# Patient Record
Sex: FEMALE | Race: OTHER | Employment: OTHER | URBAN - METROPOLITAN AREA
[De-identification: names, ages, dates, MRNs, and addresses within clinical notes are randomized per-mention and may not be internally consistent; named-entity substitution may affect disease eponyms.]

---

## 2017-11-29 ENCOUNTER — OP HISTORICAL/CONVERTED ENCOUNTER (OUTPATIENT)
Dept: OTHER | Age: 67
End: 2017-11-29

## 2019-04-09 ENCOUNTER — OP HISTORICAL/CONVERTED ENCOUNTER (OUTPATIENT)
Dept: OTHER | Age: 69
End: 2019-04-09

## 2019-05-31 ENCOUNTER — HOSPITAL ENCOUNTER (OUTPATIENT)
Dept: PREADMISSION TESTING | Age: 69
Discharge: HOME | End: 2019-05-31
Payer: MEDICARE

## 2019-05-31 VITALS
DIASTOLIC BLOOD PRESSURE: 84 MMHG | BODY MASS INDEX: 24.51 KG/M2 | SYSTOLIC BLOOD PRESSURE: 135 MMHG | RESPIRATION RATE: 16 BRPM | HEART RATE: 66 BPM | HEIGHT: 62 IN | TEMPERATURE: 97.4 F | WEIGHT: 133.2 LBS

## 2019-05-31 LAB
ANION GAP SERPL CALC-SCNC: 5 MMOL/L (ref 5–15)
ATRIAL RATE: 64 BPM
BASOPHILS # BLD: 0.1 K/UL (ref 0–0.1)
BASOPHILS NFR BLD: 1 % (ref 0–1)
BUN SERPL-MCNC: 11 MG/DL (ref 6–20)
BUN/CREAT SERPL: 15 (ref 12–20)
CALCIUM SERPL-MCNC: 9.4 MG/DL (ref 8.5–10.1)
CALCULATED P AXIS, ECG09: 46 DEGREES
CALCULATED R AXIS, ECG10: 16 DEGREES
CALCULATED T AXIS, ECG11: 30 DEGREES
CHLORIDE SERPL-SCNC: 107 MMOL/L (ref 97–108)
CO2 SERPL-SCNC: 27 MMOL/L (ref 21–32)
CREAT SERPL-MCNC: 0.75 MG/DL (ref 0.55–1.02)
DIAGNOSIS, 93000: NORMAL
DIFFERENTIAL METHOD BLD: NORMAL
EOSINOPHIL # BLD: 0.1 K/UL (ref 0–0.4)
EOSINOPHIL NFR BLD: 2 % (ref 0–7)
ERYTHROCYTE [DISTWIDTH] IN BLOOD BY AUTOMATED COUNT: 13.5 % (ref 11.5–14.5)
GLUCOSE SERPL-MCNC: 89 MG/DL (ref 65–100)
HCT VFR BLD AUTO: 40.5 % (ref 35–47)
HGB BLD-MCNC: 13.1 G/DL (ref 11.5–16)
IMM GRANULOCYTES # BLD AUTO: 0 K/UL (ref 0–0.04)
IMM GRANULOCYTES NFR BLD AUTO: 0 % (ref 0–0.5)
LYMPHOCYTES # BLD: 2.2 K/UL (ref 0.8–3.5)
LYMPHOCYTES NFR BLD: 37 % (ref 12–49)
MCH RBC QN AUTO: 27.8 PG (ref 26–34)
MCHC RBC AUTO-ENTMCNC: 32.3 G/DL (ref 30–36.5)
MCV RBC AUTO: 85.8 FL (ref 80–99)
MONOCYTES # BLD: 0.6 K/UL (ref 0–1)
MONOCYTES NFR BLD: 10 % (ref 5–13)
NEUTS SEG # BLD: 3 K/UL (ref 1.8–8)
NEUTS SEG NFR BLD: 50 % (ref 32–75)
NRBC # BLD: 0 K/UL (ref 0–0.01)
NRBC BLD-RTO: 0 PER 100 WBC
P-R INTERVAL, ECG05: 154 MS
PLATELET # BLD AUTO: 305 K/UL (ref 150–400)
PMV BLD AUTO: 11.2 FL (ref 8.9–12.9)
POTASSIUM SERPL-SCNC: 4.3 MMOL/L (ref 3.5–5.1)
Q-T INTERVAL, ECG07: 432 MS
QRS DURATION, ECG06: 70 MS
QTC CALCULATION (BEZET), ECG08: 445 MS
RBC # BLD AUTO: 4.72 M/UL (ref 3.8–5.2)
SODIUM SERPL-SCNC: 139 MMOL/L (ref 136–145)
VENTRICULAR RATE, ECG03: 64 BPM
WBC # BLD AUTO: 6.1 K/UL (ref 3.6–11)

## 2019-05-31 PROCEDURE — 93005 ELECTROCARDIOGRAM TRACING: CPT

## 2019-05-31 PROCEDURE — 80048 BASIC METABOLIC PNL TOTAL CA: CPT

## 2019-05-31 PROCEDURE — 36415 COLL VENOUS BLD VENIPUNCTURE: CPT

## 2019-05-31 PROCEDURE — 85025 COMPLETE CBC W/AUTO DIFF WBC: CPT

## 2019-05-31 RX ORDER — LANOLIN ALCOHOL/MO/W.PET/CERES
325 CREAM (GRAM) TOPICAL
COMMUNITY

## 2019-05-31 RX ORDER — ATORVASTATIN CALCIUM 20 MG/1
20 TABLET, FILM COATED ORAL
COMMUNITY

## 2019-05-31 RX ORDER — TRIAMTERENE/HYDROCHLOROTHIAZID 37.5-25 MG
1 TABLET ORAL DAILY
COMMUNITY

## 2019-05-31 RX ORDER — ASPIRIN 81 MG/1
81 TABLET ORAL DAILY
COMMUNITY

## 2019-05-31 RX ORDER — LEVOTHYROXINE SODIUM 50 UG/1
50 TABLET ORAL
COMMUNITY

## 2019-05-31 NOTE — PERIOP NOTES
DO NOT EAT OR DRINK ANYTHING AFTER MIDNIGHT, except as instructed THE NIGHT BEFORE SURGERY. PT GIVEN OPPORTUNITY TO ASK ADDITIONAL QUESTIONS.

## 2019-06-12 ENCOUNTER — ANESTHESIA EVENT (OUTPATIENT)
Dept: SURGERY | Age: 69
DRG: 027 | End: 2019-06-12
Payer: MEDICARE

## 2019-06-13 ENCOUNTER — ANESTHESIA (OUTPATIENT)
Dept: SURGERY | Age: 69
DRG: 027 | End: 2019-06-13
Payer: MEDICARE

## 2019-06-13 ENCOUNTER — HOSPITAL ENCOUNTER (INPATIENT)
Age: 69
LOS: 3 days | Discharge: HOME | DRG: 027 | End: 2019-06-16
Attending: NEUROLOGICAL SURGERY | Admitting: NEUROLOGICAL SURGERY
Payer: MEDICARE

## 2019-06-13 ENCOUNTER — APPOINTMENT (OUTPATIENT)
Dept: GENERAL RADIOLOGY | Age: 69
DRG: 027 | End: 2019-06-13
Attending: ANESTHESIOLOGY
Payer: MEDICARE

## 2019-06-13 PROBLEM — G51.39 HEMIFACIAL SPASM: Status: ACTIVE | Noted: 2019-06-13

## 2019-06-13 LAB
ARTERIAL PATENCY WRIST A: YES
BASE DEFICIT BLD-SCNC: 5 MMOL/L
BDY SITE: ABNORMAL
GAS FLOW.O2 O2 DELIVERY SYS: ABNORMAL L/MIN
GAS FLOW.O2 SETTING OXYMISER: 23 BPM
HCO3 BLD-SCNC: 20.1 MMOL/L (ref 22–26)
O2/TOTAL GAS SETTING VFR VENT: 0.33 %
PCO2 BLD: 34.5 MMHG (ref 35–45)
PEEP RESPIRATORY: 5 CMH2O
PH BLD: 7.37 [PH] (ref 7.35–7.45)
PO2 BLD: 135 MMHG (ref 80–100)
SAO2 % BLD: 99 % (ref 92–97)
SPECIMEN TYPE: ABNORMAL
VENTILATION MODE VENT: ABNORMAL
VOLUME CONTROL IVLC: YES
VT SETTING VENT: 340 ML

## 2019-06-13 PROCEDURE — 77030003892 HC BIT DRL TWST MEDT -B: Performed by: NEUROLOGICAL SURGERY

## 2019-06-13 PROCEDURE — 77030013797 HC KT TRNSDUC PRSSR EDWD -A

## 2019-06-13 PROCEDURE — 77030020782 HC GWN BAIR PAWS FLX 3M -B

## 2019-06-13 PROCEDURE — 0NB70ZZ EXCISION OF OCCIPITAL BONE, OPEN APPROACH: ICD-10-PCS | Performed by: NEUROLOGICAL SURGERY

## 2019-06-13 PROCEDURE — 74011250636 HC RX REV CODE- 250/636: Performed by: NEUROLOGICAL SURGERY

## 2019-06-13 PROCEDURE — 77030019655 HC PRB STIM CRAN MEDT -B: Performed by: NEUROLOGICAL SURGERY

## 2019-06-13 PROCEDURE — 77030008684 HC TU ET CUF COVD -B: Performed by: ANESTHESIOLOGY

## 2019-06-13 PROCEDURE — 05HM33Z INSERTION OF INFUSION DEVICE INTO RIGHT INTERNAL JUGULAR VEIN, PERCUTANEOUS APPROACH: ICD-10-PCS | Performed by: ANESTHESIOLOGY

## 2019-06-13 PROCEDURE — 82803 BLOOD GASES ANY COMBINATION: CPT

## 2019-06-13 PROCEDURE — 77030003029 HC SUT VCRL J&J -B: Performed by: NEUROLOGICAL SURGERY

## 2019-06-13 PROCEDURE — 74011000250 HC RX REV CODE- 250: Performed by: NEUROLOGICAL SURGERY

## 2019-06-13 PROCEDURE — 76210000016 HC OR PH I REC 1 TO 1.5 HR: Performed by: NEUROLOGICAL SURGERY

## 2019-06-13 PROCEDURE — 77030002946 HC SUT NRLN J&J -B: Performed by: NEUROLOGICAL SURGERY

## 2019-06-13 PROCEDURE — 74011000272 HC RX REV CODE- 272: Performed by: NEUROLOGICAL SURGERY

## 2019-06-13 PROCEDURE — B543ZZA ULTRASONOGRAPHY OF RIGHT JUGULAR VEINS, GUIDANCE: ICD-10-PCS | Performed by: ANESTHESIOLOGY

## 2019-06-13 PROCEDURE — C1751 CATH, INF, PER/CENT/MIDLINE: HCPCS

## 2019-06-13 PROCEDURE — 36415 COLL VENOUS BLD VENIPUNCTURE: CPT

## 2019-06-13 PROCEDURE — 74011250636 HC RX REV CODE- 250/636

## 2019-06-13 PROCEDURE — 76060000038 HC ANESTHESIA 3.5 TO 4 HR: Performed by: NEUROLOGICAL SURGERY

## 2019-06-13 PROCEDURE — 77030013137 HC MRK XR CRAN BUSA -A: Performed by: NEUROLOGICAL SURGERY

## 2019-06-13 PROCEDURE — 86900 BLOOD TYPING SEROLOGIC ABO: CPT

## 2019-06-13 PROCEDURE — 4A133B1 MONITORING OF ARTERIAL PRESSURE, PERIPHERAL, PERCUTANEOUS APPROACH: ICD-10-PCS | Performed by: ANESTHESIOLOGY

## 2019-06-13 PROCEDURE — 77030031139 HC SUT VCRL2 J&J -A: Performed by: NEUROLOGICAL SURGERY

## 2019-06-13 PROCEDURE — 74011250637 HC RX REV CODE- 250/637: Performed by: NEUROLOGICAL SURGERY

## 2019-06-13 PROCEDURE — 77030032490 HC SLV COMPR SCD KNE COVD -B: Performed by: NEUROLOGICAL SURGERY

## 2019-06-13 PROCEDURE — 71045 X-RAY EXAM CHEST 1 VIEW: CPT

## 2019-06-13 PROCEDURE — 4A133J1 MONITORING OF ARTERIAL PULSE, PERIPHERAL, PERCUTANEOUS APPROACH: ICD-10-PCS | Performed by: ANESTHESIOLOGY

## 2019-06-13 PROCEDURE — 86923 COMPATIBILITY TEST ELECTRIC: CPT

## 2019-06-13 PROCEDURE — P9045 ALBUMIN (HUMAN), 5%, 250 ML: HCPCS

## 2019-06-13 PROCEDURE — 77030011640 HC PAD GRND REM COVD -A: Performed by: NEUROLOGICAL SURGERY

## 2019-06-13 PROCEDURE — 36600 WITHDRAWAL OF ARTERIAL BLOOD: CPT

## 2019-06-13 PROCEDURE — 77030004391 HC BUR FLUT MEDT -C: Performed by: NEUROLOGICAL SURGERY

## 2019-06-13 PROCEDURE — 03HY32Z INSERTION OF MONITORING DEVICE INTO UPPER ARTERY, PERCUTANEOUS APPROACH: ICD-10-PCS | Performed by: ANESTHESIOLOGY

## 2019-06-13 PROCEDURE — 77030004472 HC BUR TAPR MEDT -B: Performed by: NEUROLOGICAL SURGERY

## 2019-06-13 PROCEDURE — 74011000250 HC RX REV CODE- 250

## 2019-06-13 PROCEDURE — 77030014491 HC PLEDG PTFE BARD -A: Performed by: NEUROLOGICAL SURGERY

## 2019-06-13 PROCEDURE — C1713 ANCHOR/SCREW BN/BN,TIS/BN: HCPCS | Performed by: NEUROLOGICAL SURGERY

## 2019-06-13 PROCEDURE — 77030002973 HC SUT PLEDG CV SFT OVL TELE -B: Performed by: NEUROLOGICAL SURGERY

## 2019-06-13 PROCEDURE — 65610000006 HC RM INTENSIVE CARE

## 2019-06-13 PROCEDURE — 77030013079 HC BLNKT BAIR HGGR 3M -A: Performed by: ANESTHESIOLOGY

## 2019-06-13 PROCEDURE — 76010000174 HC OR TIME 3.5 TO 4 HR INTENSV-TIER 1: Performed by: NEUROLOGICAL SURGERY

## 2019-06-13 PROCEDURE — 77030014647 HC SEAL FBRN TISSL BAXT -D: Performed by: NEUROLOGICAL SURGERY

## 2019-06-13 PROCEDURE — 00NM0ZZ RELEASE FACIAL NERVE, OPEN APPROACH: ICD-10-PCS | Performed by: NEUROLOGICAL SURGERY

## 2019-06-13 PROCEDURE — 77030018836 HC SOL IRR NACL ICUM -A: Performed by: NEUROLOGICAL SURGERY

## 2019-06-13 PROCEDURE — 77030019908 HC STETH ESOPH SIMS -A: Performed by: ANESTHESIOLOGY

## 2019-06-13 PROCEDURE — 74011250636 HC RX REV CODE- 250/636: Performed by: ANESTHESIOLOGY

## 2019-06-13 DEVICE — 5CC HYDROSET INJECTABLE CEMENT
Type: IMPLANTABLE DEVICE | Site: BRAIN | Status: FUNCTIONAL
Brand: HYDROSET

## 2019-06-13 RX ORDER — ONDANSETRON 2 MG/ML
INJECTION INTRAMUSCULAR; INTRAVENOUS AS NEEDED
Status: DISCONTINUED | OUTPATIENT
Start: 2019-06-13 | End: 2019-06-13 | Stop reason: HOSPADM

## 2019-06-13 RX ORDER — SODIUM CHLORIDE 0.9 % (FLUSH) 0.9 %
5-40 SYRINGE (ML) INJECTION EVERY 8 HOURS
Status: DISCONTINUED | OUTPATIENT
Start: 2019-06-13 | End: 2019-06-13 | Stop reason: HOSPADM

## 2019-06-13 RX ORDER — OXYCODONE HYDROCHLORIDE 5 MG/1
5 TABLET ORAL AS NEEDED
Status: DISCONTINUED | OUTPATIENT
Start: 2019-06-13 | End: 2019-06-13 | Stop reason: HOSPADM

## 2019-06-13 RX ORDER — ACETAMINOPHEN 325 MG/1
650 TABLET ORAL ONCE
Status: DISCONTINUED | OUTPATIENT
Start: 2019-06-13 | End: 2019-06-13 | Stop reason: HOSPADM

## 2019-06-13 RX ORDER — MORPHINE SULFATE 10 MG/ML
2 INJECTION, SOLUTION INTRAMUSCULAR; INTRAVENOUS
Status: DISCONTINUED | OUTPATIENT
Start: 2019-06-13 | End: 2019-06-13 | Stop reason: HOSPADM

## 2019-06-13 RX ORDER — CEFAZOLIN SODIUM/WATER 2 G/20 ML
2 SYRINGE (ML) INTRAVENOUS ONCE
Status: COMPLETED | OUTPATIENT
Start: 2019-06-13 | End: 2019-06-13

## 2019-06-13 RX ORDER — MANNITOL 20 G/100ML
INJECTION, SOLUTION INTRAVENOUS
Status: DISCONTINUED | OUTPATIENT
Start: 2019-06-13 | End: 2019-06-13 | Stop reason: HOSPADM

## 2019-06-13 RX ORDER — HYDROMORPHONE HYDROCHLORIDE 2 MG/ML
INJECTION, SOLUTION INTRAMUSCULAR; INTRAVENOUS; SUBCUTANEOUS AS NEEDED
Status: DISCONTINUED | OUTPATIENT
Start: 2019-06-13 | End: 2019-06-13 | Stop reason: HOSPADM

## 2019-06-13 RX ORDER — DEXAMETHASONE SODIUM PHOSPHATE 4 MG/ML
INJECTION, SOLUTION INTRA-ARTICULAR; INTRALESIONAL; INTRAMUSCULAR; INTRAVENOUS; SOFT TISSUE AS NEEDED
Status: DISCONTINUED | OUTPATIENT
Start: 2019-06-13 | End: 2019-06-13 | Stop reason: HOSPADM

## 2019-06-13 RX ORDER — DEXAMETHASONE SODIUM PHOSPHATE 4 MG/ML
4 INJECTION, SOLUTION INTRA-ARTICULAR; INTRALESIONAL; INTRAMUSCULAR; INTRAVENOUS; SOFT TISSUE EVERY 6 HOURS
Status: DISCONTINUED | OUTPATIENT
Start: 2019-06-13 | End: 2019-06-14

## 2019-06-13 RX ORDER — FENTANYL CITRATE 50 UG/ML
50 INJECTION, SOLUTION INTRAMUSCULAR; INTRAVENOUS AS NEEDED
Status: DISCONTINUED | OUTPATIENT
Start: 2019-06-13 | End: 2019-06-13 | Stop reason: HOSPADM

## 2019-06-13 RX ORDER — AMOXICILLIN 250 MG
1 CAPSULE ORAL DAILY
Status: DISCONTINUED | OUTPATIENT
Start: 2019-06-14 | End: 2019-06-16 | Stop reason: HOSPADM

## 2019-06-13 RX ORDER — SODIUM CHLORIDE 9 MG/ML
250 INJECTION, SOLUTION INTRAVENOUS AS NEEDED
Status: DISCONTINUED | OUTPATIENT
Start: 2019-06-13 | End: 2019-06-13

## 2019-06-13 RX ORDER — TRIAMTERENE/HYDROCHLOROTHIAZID 37.5-25 MG
1 TABLET ORAL DAILY
Status: DISCONTINUED | OUTPATIENT
Start: 2019-06-14 | End: 2019-06-16 | Stop reason: HOSPADM

## 2019-06-13 RX ORDER — PROPOFOL 10 MG/ML
INJECTION, EMULSION INTRAVENOUS AS NEEDED
Status: DISCONTINUED | OUTPATIENT
Start: 2019-06-13 | End: 2019-06-13 | Stop reason: HOSPADM

## 2019-06-13 RX ORDER — ROCURONIUM BROMIDE 10 MG/ML
INJECTION, SOLUTION INTRAVENOUS AS NEEDED
Status: DISCONTINUED | OUTPATIENT
Start: 2019-06-13 | End: 2019-06-13 | Stop reason: HOSPADM

## 2019-06-13 RX ORDER — LEVOTHYROXINE SODIUM 50 UG/1
50 TABLET ORAL
Status: DISCONTINUED | OUTPATIENT
Start: 2019-06-14 | End: 2019-06-16 | Stop reason: HOSPADM

## 2019-06-13 RX ORDER — FENTANYL CITRATE 50 UG/ML
25 INJECTION, SOLUTION INTRAMUSCULAR; INTRAVENOUS
Status: COMPLETED | OUTPATIENT
Start: 2019-06-13 | End: 2019-06-13

## 2019-06-13 RX ORDER — LIDOCAINE HYDROCHLORIDE 10 MG/ML
0.5 INJECTION, SOLUTION EPIDURAL; INFILTRATION; INTRACAUDAL; PERINEURAL AS NEEDED
Status: DISCONTINUED | OUTPATIENT
Start: 2019-06-13 | End: 2019-06-13 | Stop reason: HOSPADM

## 2019-06-13 RX ORDER — SODIUM CHLORIDE 0.9 % (FLUSH) 0.9 %
5-40 SYRINGE (ML) INJECTION EVERY 8 HOURS
Status: DISCONTINUED | OUTPATIENT
Start: 2019-06-13 | End: 2019-06-16 | Stop reason: HOSPADM

## 2019-06-13 RX ORDER — MIDAZOLAM HYDROCHLORIDE 1 MG/ML
1 INJECTION, SOLUTION INTRAMUSCULAR; INTRAVENOUS AS NEEDED
Status: DISCONTINUED | OUTPATIENT
Start: 2019-06-13 | End: 2019-06-13 | Stop reason: HOSPADM

## 2019-06-13 RX ORDER — CEFAZOLIN SODIUM/WATER 2 G/20 ML
2 SYRINGE (ML) INTRAVENOUS EVERY 8 HOURS
Status: COMPLETED | OUTPATIENT
Start: 2019-06-13 | End: 2019-06-14

## 2019-06-13 RX ORDER — PHENYLEPHRINE HCL IN 0.9% NACL 0.4MG/10ML
SYRINGE (ML) INTRAVENOUS AS NEEDED
Status: DISCONTINUED | OUTPATIENT
Start: 2019-06-13 | End: 2019-06-13 | Stop reason: HOSPADM

## 2019-06-13 RX ORDER — SODIUM CHLORIDE 0.9 % (FLUSH) 0.9 %
5-40 SYRINGE (ML) INJECTION AS NEEDED
Status: DISCONTINUED | OUTPATIENT
Start: 2019-06-13 | End: 2019-06-13 | Stop reason: HOSPADM

## 2019-06-13 RX ORDER — POTASSIUM CHLORIDE AND SODIUM CHLORIDE 450; 150 MG/100ML; MG/100ML
INJECTION, SOLUTION INTRAVENOUS CONTINUOUS
Status: DISCONTINUED | OUTPATIENT
Start: 2019-06-13 | End: 2019-06-16 | Stop reason: HOSPADM

## 2019-06-13 RX ORDER — ONDANSETRON 2 MG/ML
4 INJECTION INTRAMUSCULAR; INTRAVENOUS AS NEEDED
Status: DISCONTINUED | OUTPATIENT
Start: 2019-06-13 | End: 2019-06-13 | Stop reason: HOSPADM

## 2019-06-13 RX ORDER — SODIUM CHLORIDE 0.9 % (FLUSH) 0.9 %
5-40 SYRINGE (ML) INJECTION AS NEEDED
Status: DISCONTINUED | OUTPATIENT
Start: 2019-06-13 | End: 2019-06-16 | Stop reason: HOSPADM

## 2019-06-13 RX ORDER — ACETAMINOPHEN 325 MG/1
650 TABLET ORAL
Status: DISCONTINUED | OUTPATIENT
Start: 2019-06-13 | End: 2019-06-16 | Stop reason: HOSPADM

## 2019-06-13 RX ORDER — ATORVASTATIN CALCIUM 20 MG/1
20 TABLET, FILM COATED ORAL
Status: DISCONTINUED | OUTPATIENT
Start: 2019-06-13 | End: 2019-06-16 | Stop reason: HOSPADM

## 2019-06-13 RX ORDER — ALBUMIN HUMAN 50 G/1000ML
SOLUTION INTRAVENOUS AS NEEDED
Status: DISCONTINUED | OUTPATIENT
Start: 2019-06-13 | End: 2019-06-13 | Stop reason: HOSPADM

## 2019-06-13 RX ORDER — SUCCINYLCHOLINE CHLORIDE 20 MG/ML
INJECTION INTRAMUSCULAR; INTRAVENOUS AS NEEDED
Status: DISCONTINUED | OUTPATIENT
Start: 2019-06-13 | End: 2019-06-13 | Stop reason: HOSPADM

## 2019-06-13 RX ORDER — DEXTROSE, SODIUM CHLORIDE, SODIUM LACTATE, POTASSIUM CHLORIDE, AND CALCIUM CHLORIDE 5; .6; .31; .03; .02 G/100ML; G/100ML; G/100ML; G/100ML; G/100ML
125 INJECTION, SOLUTION INTRAVENOUS CONTINUOUS
Status: DISCONTINUED | OUTPATIENT
Start: 2019-06-13 | End: 2019-06-13 | Stop reason: HOSPADM

## 2019-06-13 RX ORDER — LABETALOL HYDROCHLORIDE 5 MG/ML
10 INJECTION, SOLUTION INTRAVENOUS
Status: DISCONTINUED | OUTPATIENT
Start: 2019-06-13 | End: 2019-06-14

## 2019-06-13 RX ORDER — LIDOCAINE HYDROCHLORIDE 20 MG/ML
INJECTION, SOLUTION EPIDURAL; INFILTRATION; INTRACAUDAL; PERINEURAL AS NEEDED
Status: DISCONTINUED | OUTPATIENT
Start: 2019-06-13 | End: 2019-06-13 | Stop reason: HOSPADM

## 2019-06-13 RX ORDER — LIDOCAINE HYDROCHLORIDE AND EPINEPHRINE 10; 10 MG/ML; UG/ML
INJECTION, SOLUTION INFILTRATION; PERINEURAL AS NEEDED
Status: DISCONTINUED | OUTPATIENT
Start: 2019-06-13 | End: 2019-06-13 | Stop reason: HOSPADM

## 2019-06-13 RX ORDER — SODIUM CHLORIDE, SODIUM LACTATE, POTASSIUM CHLORIDE, CALCIUM CHLORIDE 600; 310; 30; 20 MG/100ML; MG/100ML; MG/100ML; MG/100ML
INJECTION, SOLUTION INTRAVENOUS
Status: DISCONTINUED | OUTPATIENT
Start: 2019-06-13 | End: 2019-06-13 | Stop reason: HOSPADM

## 2019-06-13 RX ORDER — OXYCODONE AND ACETAMINOPHEN 5; 325 MG/1; MG/1
1 TABLET ORAL
Status: DISCONTINUED | OUTPATIENT
Start: 2019-06-13 | End: 2019-06-16 | Stop reason: HOSPADM

## 2019-06-13 RX ORDER — MIDAZOLAM HYDROCHLORIDE 1 MG/ML
1 INJECTION, SOLUTION INTRAMUSCULAR; INTRAVENOUS
Status: DISCONTINUED | OUTPATIENT
Start: 2019-06-13 | End: 2019-06-13 | Stop reason: HOSPADM

## 2019-06-13 RX ORDER — MIDAZOLAM HYDROCHLORIDE 1 MG/ML
INJECTION, SOLUTION INTRAMUSCULAR; INTRAVENOUS AS NEEDED
Status: DISCONTINUED | OUTPATIENT
Start: 2019-06-13 | End: 2019-06-13 | Stop reason: HOSPADM

## 2019-06-13 RX ORDER — FENTANYL CITRATE 50 UG/ML
INJECTION, SOLUTION INTRAMUSCULAR; INTRAVENOUS AS NEEDED
Status: DISCONTINUED | OUTPATIENT
Start: 2019-06-13 | End: 2019-06-13 | Stop reason: HOSPADM

## 2019-06-13 RX ORDER — DIPHENHYDRAMINE HYDROCHLORIDE 50 MG/ML
12.5 INJECTION, SOLUTION INTRAMUSCULAR; INTRAVENOUS AS NEEDED
Status: DISCONTINUED | OUTPATIENT
Start: 2019-06-13 | End: 2019-06-13 | Stop reason: HOSPADM

## 2019-06-13 RX ORDER — LANOLIN ALCOHOL/MO/W.PET/CERES
325 CREAM (GRAM) TOPICAL
Status: DISCONTINUED | OUTPATIENT
Start: 2019-06-14 | End: 2019-06-16 | Stop reason: HOSPADM

## 2019-06-13 RX ORDER — SODIUM CHLORIDE 9 MG/ML
INJECTION, SOLUTION INTRAVENOUS
Status: DISCONTINUED | OUTPATIENT
Start: 2019-06-13 | End: 2019-06-13 | Stop reason: HOSPADM

## 2019-06-13 RX ORDER — HYDRALAZINE HYDROCHLORIDE 20 MG/ML
10 INJECTION INTRAMUSCULAR; INTRAVENOUS
Status: DISCONTINUED | OUTPATIENT
Start: 2019-06-13 | End: 2019-06-14

## 2019-06-13 RX ORDER — HYDROMORPHONE HYDROCHLORIDE 1 MG/ML
1 INJECTION, SOLUTION INTRAMUSCULAR; INTRAVENOUS; SUBCUTANEOUS
Status: DISCONTINUED | OUTPATIENT
Start: 2019-06-13 | End: 2019-06-16 | Stop reason: HOSPADM

## 2019-06-13 RX ORDER — ONDANSETRON 2 MG/ML
4 INJECTION INTRAMUSCULAR; INTRAVENOUS
Status: DISCONTINUED | OUTPATIENT
Start: 2019-06-13 | End: 2019-06-16 | Stop reason: HOSPADM

## 2019-06-13 RX ORDER — SODIUM CHLORIDE, SODIUM LACTATE, POTASSIUM CHLORIDE, CALCIUM CHLORIDE 600; 310; 30; 20 MG/100ML; MG/100ML; MG/100ML; MG/100ML
125 INJECTION, SOLUTION INTRAVENOUS CONTINUOUS
Status: DISCONTINUED | OUTPATIENT
Start: 2019-06-13 | End: 2019-06-13 | Stop reason: HOSPADM

## 2019-06-13 RX ADMIN — ROCURONIUM BROMIDE 10 MG: 10 INJECTION, SOLUTION INTRAVENOUS at 07:39

## 2019-06-13 RX ADMIN — SUCCINYLCHOLINE CHLORIDE 120 MG: 20 INJECTION INTRAMUSCULAR; INTRAVENOUS at 07:40

## 2019-06-13 RX ADMIN — MANNITOL: 20 INJECTION, SOLUTION INTRAVENOUS at 07:49

## 2019-06-13 RX ADMIN — MORPHINE SULFATE 2 MG: 10 INJECTION, SOLUTION INTRAMUSCULAR; INTRAVENOUS at 12:47

## 2019-06-13 RX ADMIN — FENTANYL CITRATE 50 MCG: 50 INJECTION, SOLUTION INTRAMUSCULAR; INTRAVENOUS at 08:54

## 2019-06-13 RX ADMIN — ONDANSETRON 4 MG: 2 INJECTION INTRAMUSCULAR; INTRAVENOUS at 12:15

## 2019-06-13 RX ADMIN — POTASSIUM CHLORIDE AND SODIUM CHLORIDE: 450; 150 INJECTION, SOLUTION INTRAVENOUS at 12:15

## 2019-06-13 RX ADMIN — FENTANYL CITRATE 25 MCG: 50 INJECTION INTRAMUSCULAR; INTRAVENOUS at 12:30

## 2019-06-13 RX ADMIN — ONDANSETRON 4 MG: 2 INJECTION INTRAMUSCULAR; INTRAVENOUS at 10:15

## 2019-06-13 RX ADMIN — ALBUMIN HUMAN 100 ML: 50 SOLUTION INTRAVENOUS at 09:31

## 2019-06-13 RX ADMIN — Medication 2 G: at 14:49

## 2019-06-13 RX ADMIN — LABETALOL 20 MG/4 ML (5 MG/ML) INTRAVENOUS SYRINGE 10 MG: at 14:50

## 2019-06-13 RX ADMIN — LIDOCAINE HYDROCHLORIDE 60 MG: 20 INJECTION, SOLUTION EPIDURAL; INFILTRATION; INTRACAUDAL; PERINEURAL at 07:39

## 2019-06-13 RX ADMIN — LABETALOL 20 MG/4 ML (5 MG/ML) INTRAVENOUS SYRINGE 10 MG: at 19:05

## 2019-06-13 RX ADMIN — MIDAZOLAM HYDROCHLORIDE 5 MG: 1 INJECTION, SOLUTION INTRAMUSCULAR; INTRAVENOUS at 07:20

## 2019-06-13 RX ADMIN — MORPHINE SULFATE 2 MG: 10 INJECTION, SOLUTION INTRAMUSCULAR; INTRAVENOUS at 13:18

## 2019-06-13 RX ADMIN — FENTANYL CITRATE 25 MCG: 50 INJECTION INTRAMUSCULAR; INTRAVENOUS at 12:35

## 2019-06-13 RX ADMIN — FENTANYL CITRATE 50 MCG: 50 INJECTION, SOLUTION INTRAMUSCULAR; INTRAVENOUS at 08:00

## 2019-06-13 RX ADMIN — MIDAZOLAM 1 MG: 1 INJECTION INTRAMUSCULAR; INTRAVENOUS at 13:17

## 2019-06-13 RX ADMIN — ATORVASTATIN CALCIUM 20 MG: 20 TABLET, FILM COATED ORAL at 22:11

## 2019-06-13 RX ADMIN — HYDROMORPHONE HYDROCHLORIDE 1 MG: 1 INJECTION, SOLUTION INTRAMUSCULAR; INTRAVENOUS; SUBCUTANEOUS at 14:13

## 2019-06-13 RX ADMIN — PROPOFOL 130 MG: 10 INJECTION, EMULSION INTRAVENOUS at 07:39

## 2019-06-13 RX ADMIN — POTASSIUM CHLORIDE AND SODIUM CHLORIDE: 450; 150 INJECTION, SOLUTION INTRAVENOUS at 23:16

## 2019-06-13 RX ADMIN — DEXAMETHASONE SODIUM PHOSPHATE 4 MG: 4 INJECTION, SOLUTION INTRA-ARTICULAR; INTRALESIONAL; INTRAMUSCULAR; INTRAVENOUS; SOFT TISSUE at 17:45

## 2019-06-13 RX ADMIN — ONDANSETRON 4 MG: 2 INJECTION INTRAMUSCULAR; INTRAVENOUS at 14:56

## 2019-06-13 RX ADMIN — SODIUM CHLORIDE, SODIUM LACTATE, POTASSIUM CHLORIDE, CALCIUM CHLORIDE: 600; 310; 30; 20 INJECTION, SOLUTION INTRAVENOUS at 07:29

## 2019-06-13 RX ADMIN — MIDAZOLAM 1 MG: 1 INJECTION INTRAMUSCULAR; INTRAVENOUS at 12:50

## 2019-06-13 RX ADMIN — DEXAMETHASONE SODIUM PHOSPHATE 10 MG: 4 INJECTION, SOLUTION INTRA-ARTICULAR; INTRALESIONAL; INTRAMUSCULAR; INTRAVENOUS; SOFT TISSUE at 07:49

## 2019-06-13 RX ADMIN — Medication 10 ML: at 14:09

## 2019-06-13 RX ADMIN — Medication 80 MCG: at 07:39

## 2019-06-13 RX ADMIN — Medication 80 MCG: at 07:42

## 2019-06-13 RX ADMIN — FENTANYL CITRATE 50 MCG: 50 INJECTION, SOLUTION INTRAMUSCULAR; INTRAVENOUS at 09:14

## 2019-06-13 RX ADMIN — Medication 2 G: at 07:51

## 2019-06-13 RX ADMIN — MIDAZOLAM HYDROCHLORIDE 2 MG: 1 INJECTION, SOLUTION INTRAMUSCULAR; INTRAVENOUS at 07:29

## 2019-06-13 RX ADMIN — Medication 10 ML: at 22:11

## 2019-06-13 RX ADMIN — FENTANYL CITRATE 100 MCG: 50 INJECTION, SOLUTION INTRAMUSCULAR; INTRAVENOUS at 07:00

## 2019-06-13 RX ADMIN — SODIUM CHLORIDE: 9 INJECTION, SOLUTION INTRAVENOUS at 07:50

## 2019-06-13 RX ADMIN — FENTANYL CITRATE 100 MCG: 50 INJECTION, SOLUTION INTRAMUSCULAR; INTRAVENOUS at 07:39

## 2019-06-13 RX ADMIN — PROCHLORPERAZINE EDISYLATE 10 MG: 5 INJECTION INTRAMUSCULAR; INTRAVENOUS at 15:51

## 2019-06-13 RX ADMIN — DEXAMETHASONE SODIUM PHOSPHATE 4 MG: 4 INJECTION, SOLUTION INTRA-ARTICULAR; INTRALESIONAL; INTRAMUSCULAR; INTRAVENOUS; SOFT TISSUE at 23:15

## 2019-06-13 RX ADMIN — FENTANYL CITRATE 25 MCG: 50 INJECTION INTRAMUSCULAR; INTRAVENOUS at 12:10

## 2019-06-13 RX ADMIN — HYDROMORPHONE HYDROCHLORIDE 1 MG: 2 INJECTION, SOLUTION INTRAMUSCULAR; INTRAVENOUS; SUBCUTANEOUS at 09:28

## 2019-06-13 RX ADMIN — MORPHINE SULFATE 2 MG: 10 INJECTION, SOLUTION INTRAMUSCULAR; INTRAVENOUS at 13:36

## 2019-06-13 RX ADMIN — DEXAMETHASONE SODIUM PHOSPHATE 4 MG: 4 INJECTION, SOLUTION INTRA-ARTICULAR; INTRALESIONAL; INTRAMUSCULAR; INTRAVENOUS; SOFT TISSUE at 12:14

## 2019-06-13 RX ADMIN — MORPHINE SULFATE 2 MG: 10 INJECTION, SOLUTION INTRAMUSCULAR; INTRAVENOUS at 13:00

## 2019-06-13 RX ADMIN — Medication 2 G: at 23:15

## 2019-06-13 RX ADMIN — FENTANYL CITRATE 25 MCG: 50 INJECTION INTRAMUSCULAR; INTRAVENOUS at 12:05

## 2019-06-13 NOTE — ANESTHESIA PROCEDURE NOTES
Arterial Line Placement    Start time: 6/13/2019 7:22 AM  End time: 6/13/2019 7:25 AM  Performed by: Alana Patel MD  Authorized by: Alana Patel MD     Pre-Procedure  Indications:  Arterial pressure monitoring and blood sampling  Preanesthetic Checklist: patient identified, risks and benefits discussed, anesthesia consent, site marked, patient being monitored, timeout performed and patient being monitored    Timeout Time: 07:22        Procedure:   Prep:  Chlorhexidine  Seldinger Technique?: Yes    Orientation:  Right  Location:  Radial artery  Catheter size:  20 G  Number of attempts:  1    Assessment:   Post-procedure:  Line secured and sterile dressing applied  Patient Tolerance:  Patient tolerated the procedure well with no immediate complications

## 2019-06-13 NOTE — PROGRESS NOTES
1330 TRANSFER - IN REPORT:    Verbal report received from [facility], RN(name) on Mauro Walker  being received from PACU(unit) for routine post - op      Report consisted of patients Situation, Background, Assessment and   Recommendations(SBAR). Information from the following report(s) SBAR, Kardex, Procedure Summary, Intake/Output, MAR, Recent Results, Cardiac Rhythm SR and Alarm Parameters  was reviewed with the receiving nurse. Opportunity for questions and clarification was provided. Assessment completed upon patients arrival to unit and care assumed. 1400: Received patient from PACU. Patient drowsy but arousable, follows commands,  equal, pupils equal and reactive, language barrier noted but patient nodded appropriately to orientation questions. VSS, afebrile, 2 L nasal cannula, central line and PIV, ART line, clifton with adequate UOP. Left crani site C/D/I, bandaid on forehead from surgical procedure. 1: Patient's family informed RN that patient is VEGAN (no beef, no chicken, no fish, no eggs), diet order modified. 1750: STAND completed and passed. Shift Summary: Patient remains drowsy, opens eyes to command, remains oriented x4,  equal, pupils equal and reactive. VSS, afebrile, 2 L nasal cannula, central line and PIV, ART line, clifton with adequate UOP. Left crani site remains C/D/I. PRN pain medication and nausea medication given per MAR. PRN labetalol given 2x for SBP > 140.     1930: Bedside and Verbal shift change report given to Ramesh Noble RN (oncoming nurse) by Kayla Flores RN (offgoing nurse). Report included the following information SBAR, Kardex, Intake/Output, MAR, Recent Results, Cardiac Rhythm SR and Alarm Parameters .

## 2019-06-13 NOTE — PERIOP NOTES
TRANSFER - OUT REPORT:    Verbal report given to ICU RN Shanti(name) on Odalis Bound  being transferred to ICU 7(unit) for routine post - op       Report consisted of patients Situation, Background, Assessment and   Recommendations(SBAR). Time Pre op antibiotic CZLAL:51990  Anesthesia Stop time: 2491  Saha Present on Transfer to floor:y  Order for Saha on Chart:y  Discharge Prescriptions with Chart:n    Information from the following report(s) SBAR, OR Summary, MAR and Cardiac Rhythm NSR was reviewed with the receiving nurse. Opportunity for questions and clarification was provided. Is the patient on 02? YES       L/Min 2       Other     Is the patient on a monitor? YES    Is the nurse transporting with the patient? YES    Surgical Waiting Area notified of patient's transfer from PACU? YES, via volunteer Edgar      The following personal items collected during your admission accompanied patient upon transfer:   Dental Appliance: Dental Appliances: None  Vision: Visual Aid: Glasses  Hearing Aid:    Jewelry:    Clothing: Clothing: (clothing bag placed on lower  frame of pt's bed in pacu)  Other Valuables:  Other Valuables: (eyeglasses in case placed inside pt's belongings bag in pacu)  Valuables sent to safe:

## 2019-06-13 NOTE — ROUTINE PROCESS
Patient: Per Valle MRN: [id number]  SSN: [id number]   YOB: 1950  Age: 70 y.o. Sex: female     Patient is status post Procedure(s):  LEFT SUBOCCIPITAL CRANIECTOMY WITH MICROSURGICAL DECOMPRESSION OF CRANIAL NERVE 7/CRANIOPLASTY.     Surgeon(s) and Role:     * Margit Jeans, MD - Primary     * Amarilys Ley MD - Assisting    Local/Dose/Irrigation:  1% xylocaine with epi local given- 12 ml              Quad Lumen 06/13/19 Right (Active)        Peripheral IV 06/13/19 Left Hand (Active)      Arterial Line 06/13/19 Right Radial artery (Active)          Airway - Endotracheal Tube 06/13/19 Oral (Active)                   Dressing/Packing:  Wound Head Left-Dressing Type: 4 x 4;Petroleum gauze;Special tape (comment) (06/13/19 1039)    Splint/Cast:  ]    Other:

## 2019-06-13 NOTE — ANESTHESIA POSTPROCEDURE EVALUATION
Procedure(s):  LEFT SUBOCCIPITAL CRANIECTOMY WITH MICROSURGICAL DECOMPRESSION OF CRANIAL NERVE 7/CRANIOPLASTY. general    Anesthesia Post Evaluation        Patient location during evaluation: PACU  Patient participation: complete - patient participated  Level of consciousness: awake and alert  Pain management: adequate  Airway patency: patent  Anesthetic complications: no  Cardiovascular status: acceptable  Respiratory status: acceptable  Hydration status: acceptable  Comments: I have seen and evaluated the patient and is ready for discharge. Jan Bowden MD    Post anesthesia nausea and vomiting:  none      Vitals Value Taken Time   /74 6/13/2019 12:00 PM   Temp 36.8 °C (98.3 °F) 6/13/2019 11:24 AM   Pulse 76 6/13/2019 12:13 PM   Resp 13 6/13/2019 12:13 PM   SpO2 98 % 6/13/2019 12:13 PM   Vitals shown include unvalidated device data.

## 2019-06-13 NOTE — BRIEF OP NOTE
BRIEF OPERATIVE NOTE    Date of Procedure: 6/13/2019   Preoperative Diagnosis: LEFT VICTORINA-FACIAL SPASM  Postoperative Diagnosis: LEFT VICTORINA-FACIAL SPASM    Procedure(s):  LEFT SUBOCCIPITAL CRANIECTOMY WITH MICROSURGICAL DECOMPRESSION OF CRANIAL NERVE 7/CRANIOPLASTY  Surgeon(s) and Role:     * Jayro Hernandez MD - Primary     * Colette Ronquillo MD - Assisting         Surgical Assistant: ambika    Surgical Staff:  Circ-Anum Godoy RN  Circ-Relief: Raghu Mclaughlin RN  Circ-Intern: Rufino House RN-1: Lucas Weaver  Surg Asst-1: Noble, [address] [place] [address] Staff: Fernie Del Rio RN  Event Time In Time Out   Incision Start 0827    Incision Close       Anesthesia: General   Estimated Blood Loss: 50  Specimens: * No specimens in log *   Findings: vascular compression   Complications: no  Implants:   Implant Name Type Inv.  Item Serial No.  Lot No. LRB No. Used Action   Teleflex pledget   N/A [facility] [id number] N/A 1 Implanted   HYDROSET 5CC (HYDROXYAPATITE) - SN/A Bone HYDROSET 5CC (HYDROXYAPATITE) N/A [employer]KI [employer]ON [id number] Left 1 Implanted

## 2019-06-13 NOTE — PERIOP NOTES
Floseal 10ml, given to sterile back table LOT [id number], exp 11/7/2020    Surgicel given to sterile back table, Lot [id number], Exp 9/30/2023    Surgifoam given to back table, Lot [id number], Exp 4/30/2023

## 2019-06-13 NOTE — ANESTHESIA PROCEDURE NOTES
Central Line Placement    Start time: 6/13/2019 7:00 AM  End time: 6/13/2019 7:10 AM  Performed by: Katie Pritchard MD  Authorized by: Katie Pritchard MD     Timeout Time: 07:00       Pre-procedure: All elements of maximal sterile barrier technique followed?  Yes    2% Chlorhexidine for cutaneous antisepsis and Hand hygiene performed prior to catheter insertion    Sterile Ultrasound Technique followed?: No            Procedure:   Prep:  Chlorhexidine    Orientation:  Right  Patient position:  Trendelenburg  Catheter type:  Quad lumen  Catheter size:  7 Fr  Catheter length:  16 cm  Number of attempts:  1  Successful placement: Yes      Assessment:   Post-procedure:  Catheter secured, sterile dressing applied and sterile dressing with CHG applied  Assessment:  Blood return through all ports, free fluid flow, placement verified by x-ray and guidewire removal verified  Insertion:  Uncomplicated  Patient tolerance:  Patient tolerated the procedure well with no immediate complications

## 2019-06-13 NOTE — ANESTHESIA PREPROCEDURE EVALUATION
Relevant Problems   No relevant active problems       Anesthetic History   No history of anesthetic complications            Review of Systems / Medical History  Patient summary reviewed, nursing notes reviewed and pertinent labs reviewed    Pulmonary  Within defined limits                 Neuro/Psych   Within defined limits           Cardiovascular  Within defined limits  Hypertension                   GI/Hepatic/Renal  Within defined limits              Endo/Other  Within defined limits           Other Findings              Physical Exam    Airway  Mallampati: II  TM Distance: > 6 cm  Neck ROM: normal range of motion   Mouth opening: Normal     Cardiovascular  Regular rate and rhythm,  S1 and S2 normal,  no murmur, click, rub, or gallop             Dental  No notable dental hx       Pulmonary  Breath sounds clear to auscultation               Abdominal  GI exam deferred       Other Findings            Anesthetic Plan    ASA: 3  Anesthesia type: general    Monitoring Plan: Arterial line and CVP      Induction: Intravenous  Anesthetic plan and risks discussed with: Patient

## 2019-06-14 ENCOUNTER — APPOINTMENT (OUTPATIENT)
Dept: CT IMAGING | Age: 69
DRG: 027 | End: 2019-06-14
Attending: NEUROLOGICAL SURGERY
Payer: MEDICARE

## 2019-06-14 LAB
ANION GAP SERPL CALC-SCNC: 7 MMOL/L (ref 5–15)
BUN SERPL-MCNC: 10 MG/DL (ref 6–20)
BUN/CREAT SERPL: 11 (ref 12–20)
CALCIUM SERPL-MCNC: 8.6 MG/DL (ref 8.5–10.1)
CHLORIDE SERPL-SCNC: 106 MMOL/L (ref 97–108)
CO2 SERPL-SCNC: 24 MMOL/L (ref 21–32)
CREAT SERPL-MCNC: 0.9 MG/DL (ref 0.55–1.02)
GLUCOSE SERPL-MCNC: 148 MG/DL (ref 65–100)
POTASSIUM SERPL-SCNC: 4.2 MMOL/L (ref 3.5–5.1)
SODIUM SERPL-SCNC: 137 MMOL/L (ref 136–145)

## 2019-06-14 PROCEDURE — 97165 OT EVAL LOW COMPLEX 30 MIN: CPT

## 2019-06-14 PROCEDURE — 80048 BASIC METABOLIC PNL TOTAL CA: CPT

## 2019-06-14 PROCEDURE — 74011250636 HC RX REV CODE- 250/636: Performed by: NEUROLOGICAL SURGERY

## 2019-06-14 PROCEDURE — 74011250637 HC RX REV CODE- 250/637: Performed by: NEUROLOGICAL SURGERY

## 2019-06-14 PROCEDURE — 65660000000 HC RM CCU STEPDOWN

## 2019-06-14 PROCEDURE — 70450 CT HEAD/BRAIN W/O DYE: CPT

## 2019-06-14 PROCEDURE — 36415 COLL VENOUS BLD VENIPUNCTURE: CPT

## 2019-06-14 PROCEDURE — 97535 SELF CARE MNGMENT TRAINING: CPT

## 2019-06-14 RX ORDER — HYDRALAZINE HYDROCHLORIDE 20 MG/ML
10 INJECTION INTRAMUSCULAR; INTRAVENOUS
Status: DISCONTINUED | OUTPATIENT
Start: 2019-06-14 | End: 2019-06-16 | Stop reason: HOSPADM

## 2019-06-14 RX ORDER — DEXAMETHASONE 2 MG/1
TABLET ORAL
Qty: 12 TAB | Refills: 0 | Status: SHIPPED | OUTPATIENT
Start: 2019-06-14

## 2019-06-14 RX ORDER — LABETALOL HCL 20 MG/4 ML
10 SYRINGE (ML) INTRAVENOUS
Status: DISCONTINUED | OUTPATIENT
Start: 2019-06-14 | End: 2019-06-16 | Stop reason: HOSPADM

## 2019-06-14 RX ORDER — ACETAMINOPHEN 325 MG/1
650 TABLET ORAL
Qty: 30 TAB | Refills: 0 | Status: SHIPPED
Start: 2019-06-14

## 2019-06-14 RX ORDER — DEXAMETHASONE SODIUM PHOSPHATE 4 MG/ML
4 INJECTION, SOLUTION INTRA-ARTICULAR; INTRALESIONAL; INTRAMUSCULAR; INTRAVENOUS; SOFT TISSUE EVERY 8 HOURS
Status: DISCONTINUED | OUTPATIENT
Start: 2019-06-14 | End: 2019-06-15

## 2019-06-14 RX ADMIN — TRIAMTERENE AND HYDROCHLOROTHIAZIDE 1 TABLET: 37.5; 25 TABLET ORAL at 08:03

## 2019-06-14 RX ADMIN — Medication 10 ML: at 22:00

## 2019-06-14 RX ADMIN — DEXAMETHASONE SODIUM PHOSPHATE 4 MG: 4 INJECTION, SOLUTION INTRAMUSCULAR; INTRAVENOUS at 13:07

## 2019-06-14 RX ADMIN — SENNOSIDES,DOCUSATE SODIUM 1 TABLET: 8.6; 5 TABLET, FILM COATED ORAL at 08:03

## 2019-06-14 RX ADMIN — DEXAMETHASONE SODIUM PHOSPHATE 4 MG: 4 INJECTION, SOLUTION INTRAMUSCULAR; INTRAVENOUS at 21:52

## 2019-06-14 RX ADMIN — ATORVASTATIN CALCIUM 20 MG: 20 TABLET, FILM COATED ORAL at 21:52

## 2019-06-14 RX ADMIN — LEVOTHYROXINE SODIUM 50 MCG: 50 TABLET ORAL at 06:36

## 2019-06-14 RX ADMIN — DEXAMETHASONE SODIUM PHOSPHATE 4 MG: 4 INJECTION, SOLUTION INTRA-ARTICULAR; INTRALESIONAL; INTRAMUSCULAR; INTRAVENOUS; SOFT TISSUE at 06:36

## 2019-06-14 RX ADMIN — FERROUS SULFATE TAB 325 MG (65 MG ELEMENTAL FE) 325 MG: 325 (65 FE) TAB at 08:03

## 2019-06-14 RX ADMIN — Medication 10 ML: at 06:37

## 2019-06-14 RX ADMIN — ACETAMINOPHEN 650 MG: 325 TABLET ORAL at 17:07

## 2019-06-14 RX ADMIN — POTASSIUM CHLORIDE AND SODIUM CHLORIDE: 450; 150 INJECTION, SOLUTION INTRAVENOUS at 10:00

## 2019-06-14 RX ADMIN — Medication 10 ML: at 13:08

## 2019-06-14 RX ADMIN — Medication 2 G: at 06:36

## 2019-06-14 RX ADMIN — ACETAMINOPHEN 650 MG: 325 TABLET ORAL at 08:07

## 2019-06-14 NOTE — PROGRESS NOTES
0730: Bedside and Verbal shift change report given to Jazmin Vaughan RN (oncoming nurse) by Brian Huff RN (offgoing nurse). Report included the following information SBAR, Kardex, Intake/Output, MAR, Recent Results, Cardiac Rhythm SR and Alarm Parameters . 0800: Assessment completed. Patient neurologically intact with left facial weakness noted, has to be prompted for orientation questions due to language barrier. VSS, afebrile, 2 L nasal cannula, PIV, central line, ART line, clifton with adequate UOP. Left crani site dressing C/D/I. Patient reports mild headache, PRN tylenol given. 0945: Clifton, ART line, and Central line all removed per order. 1300: Patient up to bedside commode with 2 assists, patient stated she felt dizzy while ambulating. Patient able to void 650 ml.     1400: TRANSFER - OUT REPORT:    Verbal report given to Doron Elias RN(name) on Wander Santiago  being transferred to NSTU(unit) for routine progression of care       Report consisted of patients Situation, Background, Assessment and   Recommendations(SBAR). Information from the following report(s) SBAR, Kardex, Intake/Output, MAR, Recent Results, Cardiac Rhythm SR and Alarm Parameters  was reviewed with the receiving nurse. Lines:   Peripheral IV 06/13/19 Left Hand (Active)   Site Assessment Clean, dry, & intact 6/14/2019 12:00 PM   Phlebitis Assessment 0 6/14/2019 12:00 PM   Infiltration Assessment 0 6/14/2019 12:00 PM   Dressing Status Clean, dry, & intact 6/14/2019 12:00 PM   Dressing Type Tape;Transparent 6/14/2019 12:00 PM   Hub Color/Line Status Pink; Infusing 6/14/2019 12:00 PM   Action Taken Open ports on tubing capped 6/14/2019 12:00 PM   Alcohol Cap Used Yes 6/14/2019 12:00 PM        Opportunity for questions and clarification was provided.       Patient transported with:   Monitor  Registered Nurse  Tech

## 2019-06-14 NOTE — ROUTINE PROCESS
Bedside and Verbal shift change report given to [address] [place] [address] (oncoming nurse) by Ángel Guerra (offgoing nurse). Report included the following information SBAR, Kardex, Procedure Summary, Intake/Output, MAR, Recent Results, Cardiac Rhythm NSR and Alarm Parameters . Shift Summary: Pt drowsy but became more alert throughout night. oriented x4 with prompting, follows commands,  equal, PER, language barrier present but still able to communicate. VSS, 2L NC, C-line and PIV, ART line, clifton with adequate UOP. L crani site. Bedside and Verbal shift change report given to Ángel Guerra (oncoming nurse) by Randy Valentino RN (offgoing nurse). Report included the following information SBAR, Kardex, Procedure Summary, Intake/Output, MAR, Recent Results, Cardiac Rhythm NSR and Alarm Parameters .

## 2019-06-14 NOTE — PROGRESS NOTES
Neurosurgery Progress Note  Christiano Art  [phone]        Admit Date: 2019   LOS: 1 day        Daily Progress Note: 2019    POD:1 Day Post-Op    S/P: Procedure(s):  LEFT SUBOCCIPITAL CRANIECTOMY WITH MICROSURGICAL DECOMPRESSION OF CRANIAL NERVE 7/CRANIOPLASTY    Subjective: The patient has had progressive left hemifacial spasm over the past 2 years. 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 After pondering the risks and benefits of microvascular decompression versus botox injections, the patient decided to proceed with surgery. She underwent this procedure with Dr. Chente Franklin on 19. This morning she presents in the ICU. No spasm and only mild left facial weakness. Denies numbness, tingling, chest pain, leg pain, vomiting, difficulty swallowing,  and dyspnea. Objective:     Vital signs  Temp (24hrs), Av °F (36.7 °C), Min:97.6 °F (36.4 °C), Max:98.3 °F (36.8 °C)    07 -  1900  In: 1170.8 [P.O.:720; I.V.:450.8]  Out: 95 [Urine:95]  1901 -  0700  In: 3594 [P.O.:480; I.V.:3665]  Out: 8933 [Urine:2355]    Visit Vitals  /68   Pulse 72   Temp 98.3 °F (36.8 °C)   Resp 17   Ht 5' 2\" (1.575 m)   Wt 62.9 kg (138 lb 10.7 oz)   SpO2 99%   BMI 25.36 kg/m²    O2 Flow Rate (L/min): 2 l/min O2 Device: Nasal cannula     Pain control  Pain Assessment  Pain Scale 1: Numeric (0 - 10)  Pain Intensity 1: 0  Pain Onset 1: (movement for chest xray)  Pain Location 1: Head  Pain Intervention(s) 1: Medication (see MAR)    PT/OT  Gait                 Physical Exam:  Gen:NAD. Neuro: A&Ox3. Follows commands. Speech clear. Affect normal.  PERRL. EOMI. Mild left facial weakness but can close left eye and left nasolabial fold present. Left hearing intact. Tongue midline. NICHOLSON spontaneously. Strength 5/5 in UE and LE BL. Negative drift. Gait deferred. Skin: Left s.o.  Dressing C/D/I    24 hour results:    No results found for this or any previous visit (from the past 24 hour(s)). Assessment:     Active Problems:    Hemifacial spasm (6/13/2019)        Plan:   1. Left hemifacial spasm   - s/p s.o crani with MVD of 7th nerve   - Cont decadron   - normalize and mobilize   - PT/OT evals   - Transfer to NSTU  2. HTN   - Cont triamterene/HCTZ from home   - Hydralazine/labetalol PRN   - SBP<150  3. Dyslipidemia   - Cont statin from home  4. Hypothyroidism   - Cont levothyroxine from home  5.  Iron deficient anemia   - cont ferrous sulfate from home   - check CBC post-op    Activity: up with assist  DVT ppx: SCDs  Dispo: tbd    Plan d/w Dr. Gage Costello, ICU nurse,  at bedside      Batool Styles NP

## 2019-06-14 NOTE — PROGRESS NOTES
Problem: Falls - Risk of  Goal: *Absence of Falls  Description  Document Jennifer Bartlett Fall Risk and appropriate interventions in the flowsheet. Outcome: Progressing Towards Goal     Problem: Patient Education: Go to Patient Education Activity  Goal: Patient/Family Education  Outcome: Progressing Towards Goal     Problem: Pressure Injury - Risk of  Goal: *Prevention of pressure injury  Description  Document Adonis Scale and appropriate interventions in the flowsheet.   Outcome: Progressing Towards Goal     Problem: Patient Education: Go to Patient Education Activity  Goal: Patient/Family Education  Outcome: Progressing Towards Goal

## 2019-06-14 NOTE — PROGRESS NOTES
Problem: Self Care Deficits Care Plan (Adult)  Goal: *Acute Goals and Plan of Care (Insert Text)  Description  Occupational Therapy Goals  Initiated 6/14/2019  1. Patient will perform standing grooming task at sink with supervision/set-up within 7 day(s). 2.  Patient will perform upper body dressing and lower body dressing with supervision/set-up within 7 day(s). 3.  Patient will perform bathing with supervision/set-up within 7 day(s). 4.  Patient will perform toilet transfers with supervision/set-up within 7 day(s). 5.  Patient will perform all aspects of toileting with supervision/set-up within 7 day(s). Outcome: Progressing Towards Goal     OCCUPATIONAL THERAPY EVALUATION  Patient: Freddie Timmons (49 y.o. female)  Date: 6/14/2019  Primary Diagnosis: Hemifacial spasm [G51.39]  Procedure(s) (LRB):  LEFT SUBOCCIPITAL CRANIECTOMY WITH MICROSURGICAL DECOMPRESSION OF CRANIAL NERVE 7/CRANIOPLASTY (N/A) 1 Day Post-Op   Precautions:   Fall    ASSESSMENT :  Based on the objective data described below, patient presents with Setup upper body ADLs, Minimum assistance lower body ADLs, and Minimum assistance, Additional time and Assist x1 functional mobility via HHA s/p POD 1 L suboccipital craniectomy with decompression of CN VII/cranioplasty. Pt alert, oriented and followed 100% of one step commands (some repetition required due to language barrier). She required min A x 1 for bed mobility with mild unsteadiness and high guard noted during functional mobility to bathroom where she completed toileting with CGA. Pt demonstrated equal B UE strength, ROM, intact coordination with finger to nose and intact facial sensation. Pt with mild L droop when smiling, able to maintain eye squeeze against resistance. Recommend discharge home with family assist (daughter is a PT) and HHPT. Recommend A x 1 with HHA and gait belt with staff for toileting needs/OOB to chair for meals.      The following are barriers to ADL independence while in acute care:   - Cognitive and/or behavioral: none  - Medical condition: Decreased L facial strength during smile, decreased strength    - Other:       Patient will benefit from skilled acute intervention to address the above impairments. Patient?s rehabilitation potential is considered to be Good    Discharge recommendations: Home health (to increase independence and safety)    Barriers to discharging home, in addition to above listed impairments: none. Equipment recommendations for successful discharge (if) home: RW? PLAN :  Recommendations and Planned Interventions: self care training, functional mobility training, balance training, patient education and home safety training    Frequency/Duration: Patient will be followed by occupational therapy 5 times a week to address goals. SUBJECTIVE:   Patient stated ? I can feel that.?    OBJECTIVE DATA SUMMARY:   HISTORY:   Past Medical History:   Diagnosis Date    Hypercholesteremia     Hypertension     Low serum iron     occasional    Thyroid disease     hypothyroidism     Past Surgical History:   Procedure Laterality Date    HX HEENT Bilateral        Prior Level of Function/Environment/Context: independent, living with .  Not needing assistance with ADLs  Occupations in which the patient is/was successful, what are the barriers preventing that success:   Performance Patterns (routines, roles, habits, and rituals):   Personal Interests and/or values:   Expanded or extensive additional review of patient history:     Home Situation  Home Environment: Private residence  # Steps to Enter: 4  One/Two Story Residence: Two story  # of Interior Steps: 15  Interior Rails: Both  Lift Chair Available: No  Living Alone: No  Support Systems: Spouse/Significant Other/Partner  Patient Expects to be Discharged to[de-identified] Private residence  Current DME Used/Available at Home: Cane, quad    Hand dominance: Right    [place] [address] DEFICITS:  Cognitive/Behavioral Status:  Neurologic State: Alert  Orientation Level: Oriented X4  Cognition: Follows commands  Perception: Appears intact  Perseveration: No perseveration noted  Safety/Judgement: Awareness of environment; Fall prevention    Skin: facial bandage on L/R intact    Edema: none noted    Hearing: Auditory  Auditory Impairment: None    Vision/Perceptual:    Tracking: Able to track stimulus in all quadrants w/o difficulty                 Diplopia: No    Acuity: Within Defined Limits         Range of Motion:    AROM: Within functional limits                         Strength:    Strength: Within functional limits, 5/5 B UE shoulder to digits                Coordination:  Coordination: Within functional limits  Fine Motor Skills-Upper: Left Intact; Right Intact    Gross Motor Skills-Upper: Left Intact; Right Intact    Tone & Sensation:       Sensation: Intact to light touch on face                      Balance:  Sitting: Intact  Standing: Impaired; Without support  Standing - Static: Fair  Standing - Dynamic : Fair(high guard, reaching out for wall/stability)    Functional Mobility and Transfers for ADLs:  Bed Mobility:  Supine to Sit: Minimum assistance;Assist x1;Additional time  Sit to Supine: Minimum assistance;Assist x1  Scooting: Contact guard assistance    Transfers:  Sit to Stand: Minimum assistance;Assist x1;Additional time  Stand to Sit: Contact guard assistance;Assist x1  Bathroom Mobility: Minimum assistance    ADL Assessment:  Feeding: Independent    Oral Facial Hygiene/Grooming: Contact guard assistance    Bathing: Contact guard assistance    Upper Body Dressing: Setup    Lower Body Dressing: Minimum assistance    Toileting: Contact guard assistance(use of R grab bar, performed hygiene seated)                ADL Intervention and task modifications:          Pt educated on role of OT and required verbal cues for safety and proper hand placement during ADLs/functional transfers. Cognitive Retraining  Safety/Judgement: Awareness of environment; Fall prevention      Functional Measure:  Barthel Index:    Bathin  Bladder: 10  Bowels: 10  Groomin  Dressin  Feeding: 10  Mobility: 10  Stairs: 0  Toilet Use: 5  Transfer (Bed to Chair and Back): 10  Total: 65/100        Percentage of impairment   0%   1-19%   20-39%   40-59%   60-79%   80-99%   100%   Barthel Score 0-100 100 99-80 79-60 59-40 20-39 1-19   0     The Barthel ADL Index: Guidelines  1. The index should be used as a record of what a patient does, not as a record of what a patient could do. 2. The main aim is to establish degree of independence from any help, physical or verbal, however minor and for whatever reason. 3. The need for supervision renders the patient not independent. 4. A patient's performance should be established using the best available evidence. Asking the patient, friends/relatives and nurses are the usual sources, but direct observation and common sense are also important. However direct testing is not needed. 5. Usually the patient's performance over the preceding 24-48 hours is important, but occasionally longer periods will be relevant. 6. Middle categories imply that the patient supplies over 50 per cent of the effort. 7. Use of aids to be independent is allowed. Flora Gill., Barthel, DEliW. (5283). Functional evaluation: the Barthel Index. [facility] (14)2. Johnston Sanna nikolay DEWAYNE Youngblood, Miguel Jacobson., Gavino Elliott.[facility], [address] [place] [address] (). Measuring the change indisability after inpatient rehabilitation; comparison of the responsiveness of the Barthel Index and Functional [place] Measure. Journal of Neurology, Neurosurgery, and Psychiatry, 66(4), 986-424. Lisa Miller, N.J.A, Lew Arias  W.J.M, & Carey Mei M.A. (2004.) Assessment of post-stroke quality of life in cost-effectiveness studies: The usefulness of the Barthel Index and the EuroQoL-5D.  Quality of Life Research, 15, 583-25         Occupational Therapy Evaluation Charge Determination   History Examination Decision-Making   LOW Complexity : Brief history review  LOW Complexity : 1-3 performance deficits relating to physical, cognitive , or psychosocial skils that result in activity limitations and / or participation restrictions  LOW Complexity : No comorbidities that affect functional and no verbal or physical assistance needed to complete eval tasks       Based on the above components, the patient evaluation is determined to be of the following complexity level: LOW   Pain:  No c/o pain    Activity Tolerance:   Good  Please refer to the flowsheet for vital signs taken during this treatment. After treatment patient left:   Supine in bed  Caregiver at bedside  Call light within reach  RN notified   COMMUNICATION/EDUCATION:   The patient?s plan of care was discussed with: Physical Therapist and Registered Nurse. Patient/family have participated as able in goal setting and plan of care. This patient?s plan of care is appropriate for delegation to [facility].     Thank you for this referral.  Anmol Pederson, OT  Time Calculation: 20 mins

## 2019-06-14 NOTE — DISCHARGE INSTRUCTIONS
After Hospital Care Plan:  Discharge Instructions Craniotomy  [facility]    Patient Name: Ayla Escobar    Date of procedure: 6/13/2019  Date of discharge: 6/14/2019    Procedure: Procedure(s):  LEFT SUBOCCIPITAL CRANIECTOMY WITH MICROSURGICAL DECOMPRESSION OF CRANIAL NERVE 7/CRANIOPLASTY  PCP: Stephanie Garcia MD    Follow up appointments  Follow up with your neurosurgeon in 10-14 days. Call [phone] to make an appointment as soon as you get home from the hospital.  Follow-up care is a key part of your treatment and safety. Be sure to make and go to all appointments, and call your doctor if you are having problems. It's also a good idea to know your test results and keep a list of the medicines you take. A craniotomy is surgery to open your skull to fix a problem in your brain. It can be done for many reasons. For example, you may need a craniotomy if your brain or blood vessels are damaged or if you have a tumor or an infection in your brain. You will probably feel very tired for several weeks after surgery. You may also have headaches or problems concentrating. It can take 4 to 8 weeks to recover from surgery. Your cuts (incisions) may be sore for about 5 days after surgery. You may also have numbness and shooting pains near your wound, or swelling and bruising around your eyes. As your wound starts to heal, it may begin to itch. Medicines and ice packs can help with headaches, pain, swelling, and itching. The stitches that hold your incisions together may go away on their own or will be removed in 7 to 10 days. This depends on the type of stitches the doctor uses. Staples are usually removed in 10-14 days. It is common for your scalp to swell with fluid. After the swelling goes down, you may have a dent in your head. Some kinds of plates stay attached to hold the skull flap to your head.  If your head was shaved, you may wear clean hats or scarves on your head until your hair grows back. This care sheet gives you a general idea about how long it will take for you to recover. But each person recovers at a different pace. Follow the steps below to get better as quickly as possible. When to call your Neurosurgeon:   You have trouble thinking clearly.  You are sleeping more than you are awake.  You have a fever with a stiff neck or a severe headache.  You have any sudden vision changes.  Nausea or vomiting, severe headache.  Loss of bowel or bladder function, inability to urinate.  Increased weakness-greater than before your surgery.  Severe pain or pain not relieved by medications.  Signs of a blood clot in your leg-calf pain, tenderness, redness, swelling of lower leg.  Signs of infection-if your incision is red; continues to have drainage; drainage has a foul odor or if you have a persistent fever over 101 degrees for 24 hours.  You fall and hit your head. When to call your Primary Care Physician:   Concerns about medical conditions such as diabetes, high blood pressure, asthma, congestive heart failure.  Call if blood sugars are elevated, persistent headache or dizziness, coughing or congestion, constipation or diarrhea, burning with urination, abnormal heart rate. When to call 911 and go to the nearest emergency room:   Acute onset of chest pain, shortness of breath, difficulty breathing   You passed out (lost consciousness).  It is hard to think, move, speak, or see.  Your body is jerking or shaking. Activity   Rest when you feel tired. It is normal to want to sleep during the day. It is a good idea to plan to take a nap every day. Getting enough sleep will help you recover.  Try not to lie flat when you rest or sleep. You can use a wedge pillow, or you can put a rolled towel or foam padding under your pillow. You can also raise the head of your bed by putting bricks or wooden blocks under the bed legs.    After lying down, bring your head up slowly. This can prevent headaches or dizziness.  Try to walk each day. Start by walking a little more than you did the day before. Bit by bit, increase the amount you walk. Walking boosts blood flow and helps prevent pneumonia and constipation.  Avoid heavy lifting until your doctor says it is okay.  Do not drive for 2 to 3 weeks or until your doctor says it is okay. When you begin driving again, start with short, familiar routes in the daytime.  Ask your doctor if it is safe for you to travel by plane.  Avoid risky activities, such as climbing a ladder, for 3 months after surgery.  Avoid strenuous activities, such as bicycle riding, jogging, weight lifting, or aerobic exercise, for 3 months or until your doctor says it is okay.  Do not play any rough or contact sports for 3 months or until your doctor says it is okay.  You may engage in sexual activity. Diet   Resume usual diet; drink plenty of fluids; eat foods high in fiber   It is important to have regular bowel movements. Pain medications may cause constipation. You may want to take a stool softener (such as Senokot-S or Colace) to prevent constipation.  If constipation occurs, take a laxative (such as Dulcolax tablets, Milk of Magnesia, or a suppository). Laxatives should only be used if the above preventable measures have failed and you still have not had a bowel movement after three days   Try to avoid constipation and straining with bowel movements. Incision Care      You can wash your hair 2 to 3 days after your surgery. But do not soak your head or swim for 2 to 3 weeks.  Do not dye or color your hair for 4 weeks after your surgery.  Do not rub or apply any lotions or ointments to your incision site.  Do not scrub your wound    Medicines   If your doctor or nurse practitioner prescribed antibiotics, take them as directed. Do not stop taking them just because you feel better.  You need to take the full course of antibiotics.  If you get medicines to prevent seizures, take them exactly as directed.  If the doctor or nurse practitioner gave you a prescription medicine for pain, take it as prescribed.  If you are not taking a prescription pain medicine, ask your doctor or nurse practitioner if you can take an over-the-counter medicine.    Pain Medication Safety  DO:   Read the Medication Guide    Take your medicine exactly as prescribed    Store your medicine away from children and in a safe place    Call your healthcare provider for medical advice about side effects. You may report side effects to FDA at 1-986-FDA-5211.  Please be aware that many medications contain Tylenol. We do not want you to over medicate so please read the information below as a guide. Do not take more than 4 Grams of Tylenol in a 24 hour period if you are under age 79 or 3 Grams in 24 hours if you are over 79years old. (There are 1000 milligrams in one Gram)  o Percocet contains 325 mg of Tylenol per tablet (do not take more than 12 tablets in 24 hours)  o Lortab contains 500 mg of Tylenol per tablet (do not take more than 8 tablets in 24 hours)  o Norco contains 325 mg of Tylenol per tablet (do not take more than 12 tablets in 24 hours). DO NOT:   Do not give your medicine to others.  Do not take medicine unless it was prescribed for you.  Do not stop taking your medicine without talking to your healthcare provider.  Do not break, chew, crush, dissolve, or inject your medicine. If you cannot swallow your medicine whole, talk to your healthcare provider.  Do not drink alcohol while taking this medicine.  Do not take anti-inflammatory medications or aspirin unless instructed by your physician.

## 2019-06-14 NOTE — PROGRESS NOTES
[id number] Schroeder with home health  [facility] services    Reason for Admission:   Craniectomy with decompression of cranial nerve 7                   RRAT Score:  7                   Plan for utilizing home health TBD:                          Current Advanced Directive/Advance Care Plan: Offered to have patient complete an AMD and she declined                         Care manager met with patient and her  to introduce self and discuss transitions of care. Patient lives independently with her  Jose Carlos Ivey. Patient confirmed her PCP to be Dr Darek Whitten and sees him every 3 months and Zo and Sona for her prescriptions. Await therapy evals to determine discharge plan.  Andre Sanon RN,CRM    Care Management Interventions  PCP Verified by CM: Yes(Dr Darek Whitten)  Palliative Care Criteria Met (RRAT>21 & CHF Dx)?: No  MyChart Signup: No  Discharge Durable Medical Equipment: No  Physical Therapy Consult: Yes  Occupational Therapy Consult: Yes  Speech Therapy Consult: No  Current Support Network: Lives with Spouse(Miquel Ivey [phone])  Confirm Follow Up Transport: Family

## 2019-06-14 NOTE — PROGRESS NOTES
Attempted to see patient twice today for PT assessment, but both times  declined, stating she needs to eat first.  Nursing has had her up to the commode with assist and patient did have some dizziness with her. We will follow up tomorrow with PT assessment.     Markel Gutierrez, PT

## 2019-06-14 NOTE — DISCHARGE SUMMARY
Discharge Summary     Patient ID:  Goldie Petersen  [id number]   6 y.o.  1950    Admit date: 6/13/2019    Discharge Date: 06/16/19    Admitting Physician: Scott Velez MD     Discharge Physician: Abigail Fuller NP    Admission Diagnoses: Hemifacial spasm [G51.39]    Last Procedure: Procedure(s):  LEFT SUBOCCIPITAL CRANIECTOMY WITH MICROSURGICAL DECOMPRESSION OF CRANIAL NERVE 7/CRANIOPLASTY    Discharge Diagnoses: Active Problems:    Hemifacial spasm (6/13/2019)         Consults: None    Significant Diagnostic Studies:   1. CT head without contrast on 06/14/19 shows pneumocephalus. No other acute intracranial abnormality identified on this unenhanced examination. Patient condition upon discharge: Stable    Hospital Course: The patient has had progressive left hemifacial spasm over the past 2 years. 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 After pondering the risks and benefits of microvascular decompression versus botox injections, the patient decided to proceed with surgery. She underwent this procedure with Dr. Binh Cruz on 06/13/19. The intraoperative findings can be found in Dr. Jessica Freire operative report. Post-operatively, the patient transferred to the ICU. She did not have any hemifacial spasm. She had some weakness of her left face. She had some nausea, but this improved. She transferred to [facility] on POD1. She was afebrile and vital signs stable. She was taking PO well, voiding on her own, and ambulating without assistance. She was ready for discharge to home on POD3. She will follow-up with Dr. Binh Cruz in 10 days. Disposition: Home    Patient Instructions:   Current Discharge Medication List      START taking these medications    Details   acetaminophen (TYLENOL) 325 mg tablet Take 2 Tabs by mouth every four (4) hours as needed for Pain.   Qty: 30 Tab, Refills: 0      dexamethasone (DECADRON) 2 mg tablet Take 1 tab PO every 8 hours x 2 days then 1 tab PO every 12 hours x 2 days then 1 tab PO daily x 2 days then stop. Qty: 12 Tab, Refills: 0         CONTINUE these medications which have NOT CHANGED    Details   levothyroxine (SYNTHROID) 50 mcg tablet Take 50 mcg by mouth Daily (before breakfast). atorvastatin (LIPITOR) 20 mg tablet Take 20 mg by mouth nightly. triamterene-hydroCHLOROthiazide (MAXZIDE) 37.5-25 mg per tablet Take 1 Tab by mouth daily. ferrous sulfate (IRON) 325 mg (65 mg iron) tablet Take 325 mg by mouth daily as needed. aspirin delayed-release 81 mg tablet Take 81 mg by mouth daily. Diet: Reference my discharge instructions. Activity: Reference my discharge instructions. EXAM:   Gen:NAD. Neuro: A&Ox3. Follows commands. Speech clear. Affect normal.  PERRL. EOMI. Mild left facial weakness but can close left eye and left nasolabial fold present. Left hearing intact. Tongue midline. NICHOLSON spontaneously. Strength 5/5 in UE and LE BL. Negative drift. Gait deferred. Skin: Left s.o. Dressing C/D/I      No orders of the defined types were placed in this encounter. Total time discharging patient took greater than 30 minutes.     Signed:  Kinga Martinez NP  June 14, 2019  4:54 PM

## 2019-06-14 NOTE — PROGRESS NOTES
No further spasm. Left face weak but able to close eye. Naso-labial fold present. Ct ok.   Mobilize and xfer

## 2019-06-15 LAB
ABO + RH BLD: NORMAL
BASOPHILS # BLD: 0 K/UL (ref 0–0.1)
BASOPHILS NFR BLD: 0 % (ref 0–1)
BLD PROD TYP BPU: NORMAL
BLD PROD TYP BPU: NORMAL
BLOOD GROUP ANTIBODIES SERPL: NORMAL
BPU ID: NORMAL
BPU ID: NORMAL
CROSSMATCH RESULT,%XM: NORMAL
CROSSMATCH RESULT,%XM: NORMAL
DIFFERENTIAL METHOD BLD: ABNORMAL
EOSINOPHIL # BLD: 0 K/UL (ref 0–0.4)
EOSINOPHIL NFR BLD: 0 % (ref 0–7)
ERYTHROCYTE [DISTWIDTH] IN BLOOD BY AUTOMATED COUNT: 13.5 % (ref 11.5–14.5)
HCT VFR BLD AUTO: 33.9 % (ref 35–47)
HGB BLD-MCNC: 10.7 G/DL (ref 11.5–16)
IMM GRANULOCYTES # BLD AUTO: 0.1 K/UL (ref 0–0.04)
IMM GRANULOCYTES NFR BLD AUTO: 1 % (ref 0–0.5)
LYMPHOCYTES # BLD: 0.9 K/UL (ref 0.8–3.5)
LYMPHOCYTES NFR BLD: 6 % (ref 12–49)
MCH RBC QN AUTO: 27.3 PG (ref 26–34)
MCHC RBC AUTO-ENTMCNC: 31.6 G/DL (ref 30–36.5)
MCV RBC AUTO: 86.5 FL (ref 80–99)
MONOCYTES # BLD: 0.5 K/UL (ref 0–1)
MONOCYTES NFR BLD: 4 % (ref 5–13)
NEUTS SEG # BLD: 12.7 K/UL (ref 1.8–8)
NEUTS SEG NFR BLD: 89 % (ref 32–75)
NRBC # BLD: 0 K/UL (ref 0–0.01)
NRBC BLD-RTO: 0 PER 100 WBC
PLATELET # BLD AUTO: 280 K/UL (ref 150–400)
PMV BLD AUTO: 10.8 FL (ref 8.9–12.9)
RBC # BLD AUTO: 3.92 M/UL (ref 3.8–5.2)
SPECIMEN EXP DATE BLD: NORMAL
STATUS OF UNIT,%ST: NORMAL
STATUS OF UNIT,%ST: NORMAL
UNIT DIVISION, %UDIV: 0
UNIT DIVISION, %UDIV: 0
WBC # BLD AUTO: 14.2 K/UL (ref 3.6–11)

## 2019-06-15 PROCEDURE — 74011250636 HC RX REV CODE- 250/636: Performed by: NEUROLOGICAL SURGERY

## 2019-06-15 PROCEDURE — 74011250637 HC RX REV CODE- 250/637: Performed by: NEUROLOGICAL SURGERY

## 2019-06-15 PROCEDURE — 74011000250 HC RX REV CODE- 250

## 2019-06-15 PROCEDURE — 85025 COMPLETE CBC W/AUTO DIFF WBC: CPT

## 2019-06-15 PROCEDURE — 94760 N-INVAS EAR/PLS OXIMETRY 1: CPT

## 2019-06-15 PROCEDURE — 36415 COLL VENOUS BLD VENIPUNCTURE: CPT

## 2019-06-15 PROCEDURE — 97116 GAIT TRAINING THERAPY: CPT

## 2019-06-15 PROCEDURE — 97161 PT EVAL LOW COMPLEX 20 MIN: CPT

## 2019-06-15 PROCEDURE — 65660000000 HC RM CCU STEPDOWN

## 2019-06-15 RX ORDER — BACITRACIN 500 UNIT/G
PACKET (EA) TOPICAL
Status: COMPLETED
Start: 2019-06-15 | End: 2019-06-15

## 2019-06-15 RX ORDER — DEXAMETHASONE SODIUM PHOSPHATE 4 MG/ML
2 INJECTION, SOLUTION INTRA-ARTICULAR; INTRALESIONAL; INTRAMUSCULAR; INTRAVENOUS; SOFT TISSUE EVERY 8 HOURS
Status: DISCONTINUED | OUTPATIENT
Start: 2019-06-15 | End: 2019-06-16 | Stop reason: HOSPADM

## 2019-06-15 RX ADMIN — TRIAMTERENE AND HYDROCHLOROTHIAZIDE 1 TABLET: 37.5; 25 TABLET ORAL at 09:26

## 2019-06-15 RX ADMIN — ACETAMINOPHEN 650 MG: 325 TABLET ORAL at 13:08

## 2019-06-15 RX ADMIN — Medication 10 ML: at 14:13

## 2019-06-15 RX ADMIN — LEVOTHYROXINE SODIUM 50 MCG: 50 TABLET ORAL at 06:45

## 2019-06-15 RX ADMIN — FERROUS SULFATE TAB 325 MG (65 MG ELEMENTAL FE) 325 MG: 325 (65 FE) TAB at 09:27

## 2019-06-15 RX ADMIN — SENNOSIDES,DOCUSATE SODIUM 1 TABLET: 8.6; 5 TABLET, FILM COATED ORAL at 09:27

## 2019-06-15 RX ADMIN — Medication 10 ML: at 23:24

## 2019-06-15 RX ADMIN — DEXAMETHASONE SODIUM PHOSPHATE 2 MG: 4 INJECTION, SOLUTION INTRAMUSCULAR; INTRAVENOUS at 23:24

## 2019-06-15 RX ADMIN — ATORVASTATIN CALCIUM 20 MG: 20 TABLET, FILM COATED ORAL at 23:24

## 2019-06-15 RX ADMIN — DEXAMETHASONE SODIUM PHOSPHATE 2 MG: 4 INJECTION, SOLUTION INTRAMUSCULAR; INTRAVENOUS at 14:13

## 2019-06-15 RX ADMIN — POTASSIUM CHLORIDE AND SODIUM CHLORIDE: 450; 150 INJECTION, SOLUTION INTRAVENOUS at 01:59

## 2019-06-15 RX ADMIN — DEXAMETHASONE SODIUM PHOSPHATE 4 MG: 4 INJECTION, SOLUTION INTRAMUSCULAR; INTRAVENOUS at 06:45

## 2019-06-15 RX ADMIN — BACITRACIN 1 PACKET: 500 OINTMENT TOPICAL at 13:07

## 2019-06-15 NOTE — PROGRESS NOTES
Problem: Mobility Impaired (Adult and Pediatric)  Goal: *Acute Goals and Plan of Care (Insert Text)  Description  Physical Therapy Goals  Initiated 6/15/2019  1. Patient will move from supine to sit and sit to supine , scoot up and down and roll side to side in bed with modified independence within 7 day(s). 2.  Patient will transfer from bed to chair and chair to bed with modified independence using the least restrictive device within 7 day(s). 3.  Patient will perform sit to stand with modified independence within 7 day(s). 4.  Patient will ambulate with contact guard/supervision assist for 150 feet with the least restrictive device within 7 day(s). 5.  Patient will ascend/descend 4 stairs with single handrail(s) with minimal assistance/contact guard assist within 7 day(s). Outcome: Progressing Towards Goal   PHYSICAL THERAPY EVALUATION  Patient: Freddie Timmons (34 y.o. female)  Date: 6/15/2019  Primary Diagnosis: Hemifacial spasm [G51.39]  Procedure(s) (LRB):  LEFT SUBOCCIPITAL CRANIECTOMY WITH MICROSURGICAL DECOMPRESSION OF CRANIAL NERVE 7/CRANIOPLASTY (N/A) 2 Days Post-Op   Precautions:   Fall    ASSESSMENT :  Based on the objective data described below, the patient presents with  standing balance, unsteady gait, and overall decrease in functional mobility compared to baseline function s/p L suboccipital craniectomy POD2. Patient received in bed, agreeable to work with therapy. Transferred supine to sit with CGA, sit<>stand with CGA, and ambulated 200 ft with Constantin and HHA. During ambulation, patient demonstrated slow, shuffled, unsteady gait. Patient assisted to/from bathroom and ended session sitting up in a chair. Recommending  PT with 24/ supervision upon discharge. Patient will benefit from skilled intervention to address the above impairments. Patient?s rehabilitation potential is considered to be Fair  Factors which may influence rehabilitation potential include:   ? None noted  ? Mental ability/status  ? Medical condition  ? Home/family situation and support systems  ? Safety awareness  ? Pain tolerance/management  ? Other:      PLAN :  Recommendations and Planned Interventions:  ?           Bed Mobility Training             ? Neuromuscular Re-Education  ? Transfer Training                   ? Orthotic/Prosthetic Training  ? Gait Training                         ? Modalities  ? Therapeutic Exercises           ? Edema Management/Control  ? Therapeutic Activities            ? Patient and Family Training/Education  ? Other (comment):    Frequency/Duration: Patient will be followed by physical therapy  4 times a week to address goals. Discharge Recommendations: [facility] PT and 24/7 supervision   Further Equipment Recommendations for Discharge: None      SUBJECTIVE:   Patient stated ? I want to get washed up.?    OBJECTIVE DATA SUMMARY:   HISTORY:    Past Medical History:   Diagnosis Date    Hypercholesteremia     Hypertension     Low serum iron     occasional    Thyroid disease     hypothyroidism     Past Surgical History:   Procedure Laterality Date    HX HEENT Bilateral      Prior Level of Function/Home Situation: Patient lives with , son, and daughter-in-law.   She was independent with ADLs and ambulation     Personal factors and/or comorbidities impacting plan of care: impaired balance, stairs    Home Situation  Home Environment: Private residence  # Steps to Enter: 4  One/Two Story Residence: Two story  # of Interior Steps: 15  Interior Rails: Both  Lift Chair Available: No  Living Alone: No  Support Systems: Spouse/Significant Other/Partner  Patient Expects to be Discharged to[de-identified] Private residence  Current DME Used/Available at Home: [address], [facility]    EXAMINATION/PRESENTATION/DECISION MAKING:   Critical Behavior:  Neurologic State: Alert  Orientation Level: Oriented X4  Cognition: Follows commands  Safety/Judgement: Awareness of environment, Fall prevention  Hearing: Auditory  Auditory Impairment: None  Skin:    Edema:   Range Of Motion:  AROM: Within functional limits                       Strength:    Strength: Within functional limits                    Tone & Sensation:                  Sensation: Intact               Coordination:  Coordination: Within functional limits  Vision:      Functional Mobility:  Bed Mobility:     Supine to Sit: Contact guard assistance  Sit to Supine: (ended in chair)     Transfers:  Sit to Stand: Contact guard assistance  Stand to Sit: Contact guard assistance                       Balance:   Sitting: Intact  Standing: Impaired; With support  Standing - Static: Fair;Constant support  Standing - Dynamic : Fair;Constant support  Ambulation/Gait Training:  Distance (ft): 200 Feet (ft)  Assistive Device: Gait belt(HHA)  Ambulation - Level of Assistance: Minimal assistance        Gait Abnormalities: Shuffling gait;Trunk sway increased        Base of Support: Narrowed     Speed/Gail: Shuffled; Slow  Step Length: Left shortened;Right shortened            Functional Measure:  Barthel Index:    Bathin  Bladder: 10  Bowels: 10  Groomin  Dressin  Feeding: 10  Mobility: 10  Stairs: 0  Toilet Use: 5  Transfer (Bed to Chair and Back): 10  Total: 65/100       Percentage of impairment   0%   1-19%   20-39%   40-59%   60-79%   80-99%   100%   Barthel Score 0-100 100 99-80 79-60 59-40 20-39 1-19   0     The Barthel ADL Index: Guidelines  1. The index should be used as a record of what a patient does, not as a record of what a patient could do. 2. The main aim is to establish degree of independence from any help, physical or verbal, however minor and for whatever reason. 3. The need for supervision renders the patient not independent. 4. A patient's performance should be established using the best available evidence.  Asking the patient, friends/relatives and nurses are the usual sources, but direct observation and common sense are also important. However direct testing is not needed. 5. Usually the patient's performance over the preceding 24-48 hours is important, but occasionally longer periods will be relevant. 6. Middle categories imply that the patient supplies over 50 per cent of the effort. 7. Use of aids to be independent is allowed. Haily Chacko., Barthel, DEliW. (3876). Functional evaluation: the Barthel Index. [address] [facility] (14)2. Liz Clay nikolay DEWAYNE Youngblood, Erendira Moore., Mina Mcneil., [place], [facility] [facility] (1999). Measuring the change indisability after inpatient rehabilitation; comparison of the responsiveness of the Barthel Index and Functional Schley Measure. Journal of Neurology, Neurosurgery, and Psychiatry, 66(4), 735-676. JEVON Llamas, APT Blanco, & Julito Aguiar MEliA. (2004.) Assessment of post-stroke quality of life in cost-effectiveness studies: The usefulness of the Barthel Index and the EuroQoL-5D. Quality of Life Research, 15, 219-69            Physical Therapy Evaluation Charge Determination   History Examination Presentation Decision-Making   HIGH Complexity :3+ comorbidities / personal factors will impact the outcome/ POC  HIGH Complexity : 4+ Standardized tests and measures addressing body structure, function, activity limitation and / or participation in recreation  LOW Complexity : Stable, uncomplicated  LOW Complexity : FOTO score of       Based on the above components, the patient evaluation is determined to be of the following complexity level: LOW     Pain:  Pain Scale 1: Numeric (0 - 10)  Pain Intensity 1: 0              Activity Tolerance:   VSS, NAD  Please refer to the flowsheet for vital signs taken during this treatment. After treatment:   ?         Patient left in no apparent distress sitting up in chair  ? Patient left in no apparent distress in bed  ?          Call Antionette Trinidad left within reach  ? Nursing notified  ? Caregiver present  ? Chair alarm activated    COMMUNICATION/EDUCATION:   The patient?s plan of care was discussed with: Occupational Therapist and Registered Nurse. ?         Fall prevention education was provided and the patient/caregiver indicated understanding. ? Patient/family have participated as able in goal setting and plan of care. ?         Patient/family agree to work toward stated goals and plan of care. ?         Patient understands intent and goals of therapy, but is neutral about his/her participation. ? Patient is unable to participate in goal setting and plan of care.     Thank you for this referral.  Betina Fong, PT, DPT   Time Calculation: 23 mins

## 2019-06-15 NOTE — PROGRESS NOTES
Physical therapy:     Orders received, chart reviewed and patient evaluated by physical therapy. Recommend patient to discharge to home with [facility] PT and 24/7 supervision once medically cleared. Full evaluation to follow.      Kathie Hidalgo, PT, DPT

## 2019-06-15 NOTE — PROGRESS NOTES
Bedside and Verbal shift change report given to Fernandez Salinas RN (oncoming nurse) by Genet Park RN (offgoing nurse). Report included the following information SBAR, Kardex, Intake/Output, MAR, Recent Results and Cardiac Rhythm nsr.

## 2019-06-16 VITALS
WEIGHT: 140.5 LBS | BODY MASS INDEX: 25.86 KG/M2 | DIASTOLIC BLOOD PRESSURE: 84 MMHG | HEART RATE: 74 BPM | RESPIRATION RATE: 15 BRPM | TEMPERATURE: 98.4 F | SYSTOLIC BLOOD PRESSURE: 132 MMHG | HEIGHT: 62 IN | OXYGEN SATURATION: 99 %

## 2019-06-16 PROCEDURE — 74011250636 HC RX REV CODE- 250/636: Performed by: NEUROLOGICAL SURGERY

## 2019-06-16 PROCEDURE — 74011250637 HC RX REV CODE- 250/637: Performed by: NEUROLOGICAL SURGERY

## 2019-06-16 PROCEDURE — 94760 N-INVAS EAR/PLS OXIMETRY 1: CPT

## 2019-06-16 RX ADMIN — TRIAMTERENE AND HYDROCHLOROTHIAZIDE 1 TABLET: 37.5; 25 TABLET ORAL at 09:28

## 2019-06-16 RX ADMIN — DEXAMETHASONE SODIUM PHOSPHATE 2 MG: 4 INJECTION, SOLUTION INTRAMUSCULAR; INTRAVENOUS at 06:56

## 2019-06-16 RX ADMIN — LEVOTHYROXINE SODIUM 50 MCG: 50 TABLET ORAL at 06:57

## 2019-06-16 RX ADMIN — Medication 10 ML: at 06:00

## 2019-06-16 RX ADMIN — FERROUS SULFATE TAB 325 MG (65 MG ELEMENTAL FE) 325 MG: 325 (65 FE) TAB at 09:28

## 2019-06-16 RX ADMIN — SENNOSIDES,DOCUSATE SODIUM 1 TABLET: 8.6; 5 TABLET, FILM COATED ORAL at 09:28

## 2019-06-16 NOTE — PROGRESS NOTES
Bedside RN performed patient education and medication education. Discharge concerns initiated and discussed with patient, including clarification on \"who\" assists the patient at their home and instructions for when the home going patient should call their provider after discharge. Opportunity for questions and clarification was provided. Patient receptive to education: YES  Patient stated: I will make an appointment with Dr. Gage Costello in 2 weeks  Barriers to Education: meghan barrier  Diagnosis Education given:  YES    Length of stay: 3  Expected Day of Discharge: 6/16/19  Ask if they have \"Help at Home\" & add to white board? YES    Education Day #: 3    Medication Education Given:  YES  M in the box Medication name: decadron    Pt aware of HCAHPS survey: YES    I have reviewed discharge instructions with the patient. The patient verbalized understanding.

## 2019-06-17 NOTE — OP NOTES
[address]  OPERATIVE REPORT    Name:  Reinier Pritchard  MR#:  [id number]  :  1950  ACCOUNT #:  [de-identified]  DATE OF SERVICE:  2019      PREOPERATIVE DIAGNOSIS:  Left hemifacial spasm. POSTOPERATIVE DIAGNOSIS:  Left hemifacial spasm. PROCEDURE:  Left retrosigmoid craniectomy with microvascular decompression of cranial nerve seven, use of operating microscope, use of neurophysiologic monitoring cranioplasty less than 5 mL using HydroSet cement. SURGEON:  Michi Molina MD    ASSISTANT:  Katie Duarte MD    ANESTHESIA:   General endotracheal anesthesia. ESTIMATED BLOOD LOSS:  50 mL. COMPLICATIONS:  None. SPECIMENS:  None. IMPLANTS:  Jose Juan pledgets. OPERATIVE INDICATIONS:  This is a 21-year-old female with progressive disabling left hemifacial spasm. Workup revealed a markedly ectatic posterior circulation shifted vertebral artery and vascular compression of the nerve root entry zone. After discussing treatment options and risks of surgery with the patient and her family, informed consent was obtained. PROCEDURE:  The patient was taken to the operating room and placed under general endotracheal anesthesia. All necessary lines and monitors were placed. She was given appropriate dose of IV antibiotics. SCDs and Saha were placed. She was hooked up for neurophysiological monitoring which was monitored by Neurophysiology using brainstem auditory evoked potentials as well as facial nerve EMG and motor responses. There were no major detrimental changes. The patient was placed in Nye 3-point head fixation with head turned to the right. She was fixed into place. The retrosigmoid region was clipped, prepped and draped in standard sterile fashion. A small incision was made at the hairline behind the left ear with a skin knife, carried down with Bovie electrocautery through subcutaneous tissue.   The scalp and musculature were divided down to the skull.  The posterior mastoid was dissected out. A self-retaining retraction was placed. A high-speed drill was used to perform a craniectomy removing the left suboccipital bone, flushed it to the transverse sigmoid junction. The bone wax was used for hemostasis. The mastoid air cells were waxed as well. The dura was then opened, exposing the lateral cerebellum. The patient had been given some mannitol, slightly hyperventilated. I then tracted little along the cerebellum and opened up the arachnoid and allowed the rest of cerebrospinal fluid which will allow significant relaxation. The operating microscope was brought into the field. Under the microscope, I then dissected down into the CP angle, dissecting first identifying the lower cranial nerves 7, 9, 10, 11. These were covered in arachnoid villi. I opened up this arachnoid. Small veins were cauterized and divided with microscissors. I used sharp dissection and opened this. This allowed further reaction. I then identified the 7, 8 complex. There was some cerebellar adhered over this. This was divided. The arachnoid was divided. Over time, I used careful dissection, sharply dividing the arachnoid, and retracting the left cerebellum away from the 7, 8 nerves which were closed in arachnoid. The fifth nerve was identified rostrally. As I was able to gain more retraction, I easily saw a large ectatic vertebral artery that was all the way lateral to the left. This was almost up against the nerve root entry zone coming off of this vertebral artery. This is probably a PICA artery  was a significant artery that was looping off of this artery and digging right into the nerve root entry zone. This was pushing on the nerve root entry zone inferiorly. There were some vessels more medial but these did not appear to be causing significant pressure.   I stimulated throughout using the Prass Probe 0.1 milliamps to identify clearly the seventh nerve which was obviously medial to the eighth complex. Once the anatomy was dissected out clearly, I was able to clearly see the vascular. There was marked vascular compression of this vessel artery and somewhat embedded into the nerve root entry zone. I freed this up and I freed up the vertebral artery, arachnoid, and was able to mobilize it slightly off of the nerve root entry zone. Once I was able to develop plane and remove the arterial compression, I placed a Jose Juan pledgets and placed this right at the inferior portion of the nerve root entry zone 7, and this elevated the arterial tree and arteries away from the nerves. This was wedged in between the artery and the brain, so would not move. I then inspected circumferentially around the 7, 8 nerve root entry zone. There was no further arterial compression. An artery more distally on C7 which was at one point looked adherent to the 7, 8 complex that had moved away with this maneuver. There is no further compression of this artery. The nerve root entry zone did look very whitish and somewhat tethered. The wound was then copiously irrigated. Morin retractor was removed. Hemostasis was achieved. There was no active bleeding. The dura was then reapproximated with 4-0 Nurolon sutures. The dural defect was then covered with pieces of muscles that I harvested from the suboccipital musculature covering any of the gaps. I then performed a cranioplasty less than 5 cm with HydroSet cement ceiling off the cranial defect. The retractors were removed. The scope was taken out of the field. The wound was copiously irrigated with antibiotics again. The wound was then closed using 0 Vicryl to close the muscular fascial layer, 2-0 Vicryl in the deep dermal layer, and running 3-0 Vicryl Rapide on the skin. Wounds were cleaned and dried, dressed sterile dressing.   The patient was then taken out of [facility], extubated, and taken to the recovery room in stable condition.         MD MONET [email or web address]  D:  06/17/2019 7:02  T:  06/17/2019 7:06  JOB #:  [id number]  CC:  MD German Kam MD

## 2020-10-06 ENCOUNTER — TRANSCRIBE ORDER (OUTPATIENT)
Dept: SCHEDULING | Age: 70
End: 2020-10-06

## 2020-10-06 DIAGNOSIS — Z12.31 ENCOUNTER FOR SCREENING MAMMOGRAM FOR MALIGNANT NEOPLASM OF BREAST: Primary | ICD-10-CM

## 2020-11-20 ENCOUNTER — HOSPITAL ENCOUNTER (OUTPATIENT)
Dept: MAMMOGRAPHY | Age: 70
Discharge: HOME | End: 2020-11-20
Attending: INTERNAL MEDICINE
Payer: MEDICARE

## 2020-11-20 DIAGNOSIS — Z12.31 ENCOUNTER FOR SCREENING MAMMOGRAM FOR MALIGNANT NEOPLASM OF BREAST: ICD-10-CM

## 2020-11-20 PROCEDURE — 77067 SCR MAMMO BI INCL CAD: CPT

## 2022-01-03 ENCOUNTER — TRANSCRIBE ORDER (OUTPATIENT)
Dept: SCHEDULING | Age: 72
End: 2022-01-03

## 2022-01-03 DIAGNOSIS — Z12.31 VISIT FOR SCREENING MAMMOGRAM: Primary | ICD-10-CM

## 2022-01-12 ENCOUNTER — HOSPITAL ENCOUNTER (OUTPATIENT)
Dept: MAMMOGRAPHY | Age: 72
Discharge: HOME | End: 2022-01-12
Attending: INTERNAL MEDICINE
Payer: MEDICARE

## 2022-01-12 DIAGNOSIS — Z12.31 VISIT FOR SCREENING MAMMOGRAM: ICD-10-CM

## 2022-01-12 PROCEDURE — 77063 BREAST TOMOSYNTHESIS BI: CPT

## 2022-03-18 PROBLEM — G51.39 HEMIFACIAL SPASM: Status: ACTIVE | Noted: 2019-06-13

## 2022-03-19 LAB — HBA1C MFR BLD HPLC: 5.6 %

## 2022-08-12 LAB
A/G RATIO, 011149: 1.7
ALBUMIN, 58902: 4
ALKALINE PHOSPHATASE,APIT: 98
AST (SGOT)   (NASH), 13987: 17
BILIRUBIN, TOTAL (NASH), 13917: 0.7
BUN SERPL-MCNC: 10 MG/DL
BUN/CREATININE RATIO,BUCR: 14
CALCIUM, 015674: 10 MG/DL
CHLORIDE SERPL-SCNC: 103 MMOL/L
CHOLESTEROL, TOTAL, 804501: 143 MG/DL
CO2 SERPL-SCNC: 23 MMOL/L
CREAT SERPL-MCNC: 0.72 MG/DL
EGFR: 88
GLOBULIN,TOTAL, 011052: 2.3
GLUCOSE, SERUM  (NASH), 13989: 93
HDL CHOLESTEROL: 64
LDL CHOL, CALCULATED: 60 MG/DL
POTASSIUM SERPL-SCNC: 4.1 MMOL/L
PROTEIN, TOTAL: 6.3
SODIUM LEVEL: 137
TRIGLYCERIDES, 001174: 107
TSH SERPL DL<=0.05 MIU/L-ACNC: 3.62 M[IU]/L
VLDL CHOLESTEROL, 804564: 19

## 2022-08-30 ENCOUNTER — TRANSCRIBE ORDER (OUTPATIENT)
Dept: SCHEDULING | Age: 72
End: 2022-08-30

## 2022-08-30 DIAGNOSIS — E61.1 LOW SERUM IRON: ICD-10-CM

## 2022-08-30 DIAGNOSIS — E55.9 VITAMIN D DEFICIENCY: ICD-10-CM

## 2022-08-30 DIAGNOSIS — E07.9 THYROID DISEASE: ICD-10-CM

## 2022-08-30 DIAGNOSIS — G24.5 BLEPHAROSPASM: ICD-10-CM

## 2022-08-30 DIAGNOSIS — E78.00 HYPERCHOLESTEREMIA: ICD-10-CM

## 2022-08-30 DIAGNOSIS — Z12.31 ENCOUNTER FOR SCREENING MAMMOGRAM FOR MALIGNANT NEOPLASM OF BREAST: Primary | ICD-10-CM

## 2022-08-30 DIAGNOSIS — I10 HYPERTENSION, UNSPECIFIED TYPE: ICD-10-CM

## 2022-08-30 DIAGNOSIS — R90.89 ABNORMAL BRAIN MRI: Primary | ICD-10-CM

## 2022-08-30 DIAGNOSIS — R73.01 ELEVATED FASTING BLOOD SUGAR: ICD-10-CM

## 2023-03-20 PROBLEM — E55.9 VITAMIN D DEFICIENCY: Status: ACTIVE | Noted: 2023-03-20

## 2023-03-20 PROBLEM — I10 HYPERTENSION: Status: ACTIVE | Noted: 2023-03-20

## 2023-03-20 PROBLEM — R73.01 ELEVATED FASTING BLOOD SUGAR: Status: ACTIVE | Noted: 2023-03-20

## 2023-03-20 PROBLEM — R90.89 ABNORMAL BRAIN MRI: Status: ACTIVE | Noted: 2023-03-20

## 2023-03-20 PROBLEM — E07.9 THYROID DISEASE: Status: ACTIVE | Noted: 2023-03-20

## 2023-03-20 PROBLEM — E78.00 HYPERCHOLESTEREMIA: Status: ACTIVE | Noted: 2023-03-20

## 2023-03-20 PROBLEM — G24.5 BLEPHAROSPASM: Status: ACTIVE | Noted: 2023-03-20

## 2023-03-20 PROBLEM — E61.1 LOW SERUM IRON: Status: ACTIVE | Noted: 2023-03-20

## 2023-03-20 NOTE — PROGRESS NOTES
Lizbeth Crabtree (: 1950) is a 67 y.o. female, {established vs new:51487} patient, here for evaluation of the following chief complaint(s):  No chief complaint on file. ASSESSMENT/PLAN:  Below is the assessment and plan developed based on review of pertinent history, physical exam, labs, studies, and medications. 1. Abnormal brain MRI  2. Blepharospasm  3. Elevated fasting blood sugar  4. Hypercholesteremia  5. Low serum iron  6. Vitamin D deficiency  7. Hypertension, unspecified type  8. Thyroid disease      No follow-ups on file. SUBJECTIVE/OBJECTIVE:  HPI      Review of Systems    Physical Exam      {Time Documentation Optional:59166}    An electronic signature was used to authenticate this note.   -- Nathen Dural

## 2023-03-29 ENCOUNTER — OFFICE VISIT (OUTPATIENT)
Dept: INTERNAL MEDICINE CLINIC | Age: 73
End: 2023-03-29
Payer: MEDICARE

## 2023-03-29 VITALS
DIASTOLIC BLOOD PRESSURE: 70 MMHG | TEMPERATURE: 98 F | HEIGHT: 62 IN | HEART RATE: 68 BPM | WEIGHT: 131 LBS | BODY MASS INDEX: 24.11 KG/M2 | SYSTOLIC BLOOD PRESSURE: 128 MMHG | OXYGEN SATURATION: 99 %

## 2023-03-29 DIAGNOSIS — I10 ESSENTIAL HYPERTENSION: ICD-10-CM

## 2023-03-29 DIAGNOSIS — E03.9 HYPOTHYROIDISM, UNSPECIFIED TYPE: ICD-10-CM

## 2023-03-29 DIAGNOSIS — E78.00 HYPERCHOLESTEREMIA: Primary | ICD-10-CM

## 2023-03-29 DIAGNOSIS — Z12.31 BREAST CANCER SCREENING BY MAMMOGRAM: ICD-10-CM

## 2023-03-29 DIAGNOSIS — Z11.59 ENCOUNTER FOR HEPATITIS C SCREENING TEST FOR LOW RISK PATIENT: ICD-10-CM

## 2023-03-29 DIAGNOSIS — Z78.0 MENOPAUSE: ICD-10-CM

## 2023-03-29 PROCEDURE — G8420 CALC BMI NORM PARAMETERS: HCPCS | Performed by: INTERNAL MEDICINE

## 2023-03-29 PROCEDURE — 1101F PT FALLS ASSESS-DOCD LE1/YR: CPT | Performed by: INTERNAL MEDICINE

## 2023-03-29 PROCEDURE — G9899 SCRN MAM PERF RSLTS DOC: HCPCS | Performed by: INTERNAL MEDICINE

## 2023-03-29 PROCEDURE — 3078F DIAST BP <80 MM HG: CPT | Performed by: INTERNAL MEDICINE

## 2023-03-29 PROCEDURE — 3074F SYST BP LT 130 MM HG: CPT | Performed by: INTERNAL MEDICINE

## 2023-03-29 PROCEDURE — G8400 PT W/DXA NO RESULTS DOC: HCPCS | Performed by: INTERNAL MEDICINE

## 2023-03-29 PROCEDURE — 99214 OFFICE O/P EST MOD 30 MIN: CPT | Performed by: INTERNAL MEDICINE

## 2023-03-29 PROCEDURE — 3017F COLORECTAL CA SCREEN DOC REV: CPT | Performed by: INTERNAL MEDICINE

## 2023-03-29 PROCEDURE — 1090F PRES/ABSN URINE INCON ASSESS: CPT | Performed by: INTERNAL MEDICINE

## 2023-03-29 PROCEDURE — G8536 NO DOC ELDER MAL SCRN: HCPCS | Performed by: INTERNAL MEDICINE

## 2023-03-29 PROCEDURE — 1123F ACP DISCUSS/DSCN MKR DOCD: CPT | Performed by: INTERNAL MEDICINE

## 2023-03-29 PROCEDURE — G8510 SCR DEP NEG, NO PLAN REQD: HCPCS | Performed by: INTERNAL MEDICINE

## 2023-03-29 PROCEDURE — G8427 DOCREV CUR MEDS BY ELIG CLIN: HCPCS | Performed by: INTERNAL MEDICINE

## 2023-03-29 NOTE — PROGRESS NOTES
[address] [facility]  [address]  [place], [address] [facility]  Phone: [phone]      Oswaldo Coello (: 1950) is a 67 y.o. female, established patient, here for evaluation of the following chief complaint(s):  Follow Up Chronic Condition         SUBJECTIVE/OBJECTIVE:  HPI:  Patient is here for follow up, she did not have routine blood work for this visit. Her last blood work and office visit was in 2022. Patient is with her  Ayse Mercado and he informed they just came back from [facility] last week. Had home stool test from [facility] last year  and was normal.She states that she is doing well and does not need refills. She needs an order for annual mammogram.  Denies any breast pain or nipple discharge. Prior to Admission medications    Medication Sig Start Date End Date Taking? Authorizing Provider   diph,Damaris,Angelo,,Tet Vac-PF (ADACEL) 2 Lf-(2.5-5-3-5 mcg)-5Lf/0.5 mL susp 0.5 mL by IntraMUSCular route once for 1 dose. 3/29/23 3/29/23 Yes Anyi Morales MD   acetaminophen (TYLENOL) 325 mg tablet Take 2 Tabs by mouth every four (4) hours as needed for Pain. 19  Yes Juaquin Vee NP   levothyroxine (SYNTHROID) 50 mcg tablet Take 50 mcg by mouth Daily (before breakfast). Yes Provider, Historical   atorvastatin (LIPITOR) 20 mg tablet Take 20 mg by mouth nightly. Yes Provider, Historical   triamterene-hydroCHLOROthiazide (MAXZIDE) 37.5-25 mg per tablet Take 1 Tab by mouth daily. Yes Provider, Historical   ferrous sulfate 325 mg (65 mg iron) tablet Take 325 mg by mouth daily as needed. Yes Provider, Historical   aspirin delayed-release 81 mg tablet Take 81 mg by mouth daily.    Yes Provider, Historical        No Known Allergies     Past Medical History:   Diagnosis Date    Abnormal brain MRI     Blepharospasm     Elevated fasting blood sugar     Hypercholesteremia     Hypertension     Low serum iron     occasional    Thyroid disease hypothyroidism    Vitamin D deficiency         Family History   Problem Relation Age of Onset    Hypertension Mother     Asthma Father     Hypertension Sister     Hypertension Brother     Thyroid Disease Brother     No Known Problems Other     Hypertension Sister     No Known Problems Daughter     Anesth Problems Neg Hx         Past Surgical History:   Procedure Laterality Date    HX CATARACT REMOVAL      HX HEENT Bilateral        Review of Systems  Constitutional:  Negative for chills and fever. HENT:  Negative for congestion, ear pain, nosebleeds, sinus pain, sore throat and tinnitus. Eyes:  Negative for redness. Respiratory:  Negative for cough and shortness of breath. Cardiovascular:  Negative for chest pain and palpitations. Gastrointestinal:  Negative for abdominal pain, diarrhea, nausea and vomiting. Endocrine: Negative for cold intolerance and polyuria. Genitourinary:  Negative for dysuria and hematuria. Musculoskeletal:  Negative for back pain and neck pain. Skin:  Negative for rash. Neurological:  Negative for dizziness and headaches. Psychiatric/Behavioral: Negative. /70 (BP 1 Location: Left upper arm, BP Patient Position: Sitting)   Pulse 68   Temp 98 °F (36.7 °C) (Temporal)   Ht 5' 2\" (1.575 m)   Wt 131 lb (59.4 kg)   SpO2 99%   BMI 23.96 kg/m²      Physical Exam  Constitutional:       Appearance: Patient is with her  Melissa Keller. Soares Pancho HENT:      Head: Normocephalic and atraumatic. Right Ear: External ear normal.      Left Ear: External ear normal.      Nose: Nose normal.      Mouth/Throat:      Mouth: Mucous membranes are moist.   Eyes:      Extraocular Movements: Extraocular movements intact. Pupils: Pupils are equal, round, and reactive to light. Cardiovascular:      Rate and Rhythm: Normal rate and regular rhythm. Pulmonary:      Effort: Pulmonary effort is normal.      Breath sounds: Normal breath sounds.    Abdominal:      Palpations: Abdomen is soft. Tenderness: There is no abdominal tenderness. Musculoskeletal:      Cervical back: Normal range of motion and neck supple. Right lower leg: No edema. Left lower leg: No edema. Skin:     General: Skin is warm and dry. Neurological:      General: No focal deficit present. Mental Status: She is alert and oriented to person, place, and time. Psychiatric:         Mood and Affect: Mood normal.    ASSESSMENT/PLAN:  Below is the assessment and plan developed based on review of pertinent history, physical exam, labs, studies, and medications. 1. Hypercholesteremia  Comments: Total cholesterol 143, triglyceride 107, HDL 64, LDL 60 LFTs normal, TSH 3.6 in August 22. Advised to continue low-fat diet and atorvastatin will recheck labs  Orders:  -     CBC WITH AUTOMATED DIFF; Future  -     METABOLIC PANEL, COMPREHENSIVE; Future  -     LIPID PANEL; Future  -     TSH 3RD GENERATION; Future  2. Hypothyroidism, unspecified type  Comments:  TSH 3.62 in August 22, advised to continue levothyroxine daily  3. Essential hypertension  Comments:  Blood pressure is controlled, advised low-sodium diet and continue Maxide  4. Encounter for hepatitis C screening test for low risk patient  Comments:  Check hep C titer. advised Adacel   Orders:  -     HEPATITIS C AB; Future  5. Breast cancer screening by mammogram  Comments:  Mammogram advised and order given  Orders:  -     INOCENCIO MAMMO BI SCREENING INCL CAD; Future  6. Menopause  Comments: Will check DEXA scan  Orders:  -     DEXA BONE DENSITY STUDY AXIAL; Future    Return in about 4 months (around 7/29/2023). There are no Patient Instructions on file for this visit.      Health Maintenance Due   Topic Date Due    Hepatitis C Screening  Never done    Depression Screen  Never done    A1C test (Diabetic or Prediabetic)  Never done    DTaP/Tdap/Td series (1 - Tdap) Never done    Colorectal Cancer Screening Combo  Never done    Shingles Vaccine (1 of 2) Never done    Bone Densitometry (Dexa) Screening  Never done    Pneumococcal 65+ years (1 - PCV) Never done    Medicare Yearly Exam  Never done        Aspects of this note may have been generated using voice recognition software. Despite editing, there may be unrecognized errors. An electronic signature was used to authenticate this note.   -- Nikolay Alford MD

## 2023-03-30 ENCOUNTER — TRANSCRIBE ORDER (OUTPATIENT)
Dept: SCHEDULING | Age: 73
End: 2023-03-30

## 2023-03-30 DIAGNOSIS — Z12.31 VISIT FOR SCREENING MAMMOGRAM: Primary | ICD-10-CM

## 2023-04-06 ENCOUNTER — HOSPITAL ENCOUNTER (OUTPATIENT)
Dept: MAMMOGRAPHY | Age: 73
End: 2023-04-06
Attending: INTERNAL MEDICINE
Payer: MEDICARE

## 2023-04-06 PROCEDURE — 77067 SCR MAMMO BI INCL CAD: CPT

## 2023-04-06 PROCEDURE — 77080 DXA BONE DENSITY AXIAL: CPT

## 2023-04-06 PROCEDURE — 77063 BREAST TOMOSYNTHESIS BI: CPT

## 2023-04-13 NOTE — PROGRESS NOTES
Let her know DEXA scan shows osteoporosis,to take Calcium 600 mg 1 twice day,Vitamin D 1000 units daily and will start Boniva 150 mg 1 tab every month. Need to take it on empty stomach in the morning with 1 glass of water and cannot lay or eat for 1 hour.  Check with her which pharmacy she likes to send the [facility] [place]

## 2023-04-17 RX ORDER — IBANDRONATE SODIUM 150 MG/1
150 TABLET, FILM COATED ORAL
COMMUNITY
End: 2023-04-17 | Stop reason: SDUPTHER

## 2023-04-17 RX ORDER — IBANDRONATE SODIUM 150 MG/1
150 TABLET, FILM COATED ORAL
Qty: 3 TABLET | Refills: 3 | Status: SHIPPED | OUTPATIENT
Start: 2023-04-17

## 2023-04-17 NOTE — TELEPHONE ENCOUNTER
Requested Prescriptions     Pending Prescriptions Disp Refills    ibandronate (BONIVA) 150 mg tablet 1 Tablet 1     Sig: Take 150 mg by mouth every thirty (30) days.

## 2023-04-19 LAB
ALBUMIN SERPL-MCNC: 4.1 G/DL (ref 3.7–4.7)
ALBUMIN/GLOB SERPL: 1.7 {RATIO} (ref 1.2–2.2)
ALP SERPL-CCNC: 109 IU/L (ref 44–121)
ALT SERPL-CCNC: 21 IU/L (ref 0–32)
AST SERPL-CCNC: 17 IU/L (ref 0–40)
BASOPHILS # BLD AUTO: 0.1 X10E3/UL (ref 0–0.2)
BASOPHILS NFR BLD AUTO: 2 %
BILIRUB SERPL-MCNC: 0.8 MG/DL (ref 0–1.2)
BUN SERPL-MCNC: 12 MG/DL (ref 8–27)
BUN/CREAT SERPL: 15 (ref 12–28)
CALCIUM SERPL-MCNC: 9.8 MG/DL (ref 8.7–10.3)
CHLORIDE SERPL-SCNC: 105 MMOL/L (ref 96–106)
CHOLEST SERPL-MCNC: 158 MG/DL (ref 100–199)
CO2 SERPL-SCNC: 24 MMOL/L (ref 20–29)
CREAT SERPL-MCNC: 0.79 MG/DL (ref 0.57–1)
EGFRCR SERPLBLD CKD-EPI 2021: 79 ML/MIN/1.73
EOSINOPHIL # BLD AUTO: 0.2 X10E3/UL (ref 0–0.4)
EOSINOPHIL NFR BLD AUTO: 3 %
ERYTHROCYTE [DISTWIDTH] IN BLOOD BY AUTOMATED COUNT: 13.7 % (ref 11.7–15.4)
GLOBULIN SER CALC-MCNC: 2.4 G/DL (ref 1.5–4.5)
GLUCOSE SERPL-MCNC: 89 MG/DL (ref 70–99)
HCT VFR BLD AUTO: 41.5 % (ref 34–46.6)
HCV IGG SERPL QL IA: NON REACTIVE
HDLC SERPL-MCNC: 67 MG/DL
HGB BLD-MCNC: 13.4 G/DL (ref 11.1–15.9)
IMM GRANULOCYTES # BLD AUTO: 0 X10E3/UL (ref 0–0.1)
IMM GRANULOCYTES NFR BLD AUTO: 0 %
LDLC SERPL CALC-MCNC: 74 MG/DL (ref 0–99)
LYMPHOCYTES # BLD AUTO: 2.5 X10E3/UL (ref 0.7–3.1)
LYMPHOCYTES NFR BLD AUTO: 44 %
MCH RBC QN AUTO: 28.4 PG (ref 26.6–33)
MCHC RBC AUTO-ENTMCNC: 32.3 G/DL (ref 31.5–35.7)
MCV RBC AUTO: 88 FL (ref 79–97)
MONOCYTES # BLD AUTO: 0.5 X10E3/UL (ref 0.1–0.9)
MONOCYTES NFR BLD AUTO: 9 %
NEUTROPHILS # BLD AUTO: 2.5 X10E3/UL (ref 1.4–7)
NEUTROPHILS NFR BLD AUTO: 42 %
PLATELET # BLD AUTO: 294 X10E3/UL (ref 150–450)
POTASSIUM SERPL-SCNC: 4.4 MMOL/L (ref 3.5–5.2)
PROT SERPL-MCNC: 6.5 G/DL (ref 6–8.5)
RBC # BLD AUTO: 4.72 X10E6/UL (ref 3.77–5.28)
SODIUM SERPL-SCNC: 143 MMOL/L (ref 134–144)
TRIGL SERPL-MCNC: 95 MG/DL (ref 0–149)
TSH SERPL DL<=0.005 MIU/L-ACNC: 6.41 UIU/ML (ref 0.45–4.5)
VLDLC SERPL CALC-MCNC: 17 MG/DL (ref 5–40)
WBC # BLD AUTO: 5.8 X10E3/UL (ref 3.4–10.8)

## 2023-04-21 DIAGNOSIS — Z12.31 ENCOUNTER FOR SCREENING MAMMOGRAM FOR MALIGNANT NEOPLASM OF BREAST: Primary | ICD-10-CM

## 2023-04-23 DIAGNOSIS — Z12.31 VISIT FOR SCREENING MAMMOGRAM: Primary | ICD-10-CM

## 2023-05-17 ENCOUNTER — TELEPHONE (OUTPATIENT)
Facility: CLINIC | Age: 73
End: 2023-05-17

## 2023-05-17 RX ORDER — ATORVASTATIN CALCIUM 20 MG/1
20 TABLET, FILM COATED ORAL DAILY
Qty: 90 TABLET | Refills: 0 | Status: CANCELLED | OUTPATIENT
Start: 2023-05-17

## 2023-05-17 RX ORDER — LEVOTHYROXINE SODIUM 0.07 MG/1
75 TABLET ORAL DAILY
Qty: 90 TABLET | Refills: 0 | Status: CANCELLED | OUTPATIENT
Start: 2023-05-17

## 2023-05-17 RX ORDER — LEVOTHYROXINE SODIUM 0.07 MG/1
75 TABLET ORAL DAILY
Qty: 90 TABLET | Refills: 1 | Status: SHIPPED | OUTPATIENT
Start: 2023-05-17

## 2023-05-17 RX ORDER — ATORVASTATIN CALCIUM 20 MG/1
1 TABLET, FILM COATED ORAL DAILY
COMMUNITY
Start: 2023-02-20 | End: 2023-05-17 | Stop reason: SDUPTHER

## 2023-05-17 RX ORDER — LEVOTHYROXINE SODIUM 0.07 MG/1
75 TABLET ORAL DAILY
COMMUNITY
End: 2023-05-17 | Stop reason: SDUPTHER

## 2023-05-17 RX ORDER — ATORVASTATIN CALCIUM 20 MG/1
20 TABLET, FILM COATED ORAL DAILY
Qty: 90 TABLET | Refills: 1 | Status: SHIPPED | OUTPATIENT
Start: 2023-05-17

## 2023-05-23 RX ORDER — TRIAMTERENE AND HYDROCHLOROTHIAZIDE 37.5; 25 MG/1; MG/1
1 TABLET ORAL DAILY
COMMUNITY

## 2023-05-23 RX ORDER — ASPIRIN 81 MG/1
81 TABLET ORAL DAILY
COMMUNITY

## 2023-05-23 RX ORDER — LEVOTHYROXINE SODIUM 0.05 MG/1
50 TABLET ORAL
COMMUNITY

## 2023-05-23 RX ORDER — FERROUS SULFATE 325(65) MG
325 TABLET ORAL DAILY PRN
COMMUNITY

## 2023-05-23 RX ORDER — IBANDRONATE SODIUM 150 MG/1
150 TABLET, FILM COATED ORAL
COMMUNITY
Start: 2023-04-17

## 2023-05-23 RX ORDER — ATORVASTATIN CALCIUM 20 MG/1
20 TABLET, FILM COATED ORAL NIGHTLY
COMMUNITY

## 2023-05-23 RX ORDER — ACETAMINOPHEN 325 MG/1
650 TABLET ORAL EVERY 4 HOURS PRN
COMMUNITY
Start: 2019-06-14

## 2023-07-26 ENCOUNTER — OFFICE VISIT (OUTPATIENT)
Facility: CLINIC | Age: 73
End: 2023-07-26
Payer: COMMERCIAL

## 2023-07-26 VITALS
SYSTOLIC BLOOD PRESSURE: 120 MMHG | BODY MASS INDEX: 23.55 KG/M2 | DIASTOLIC BLOOD PRESSURE: 86 MMHG | TEMPERATURE: 98.2 F | WEIGHT: 128 LBS | HEIGHT: 62 IN | HEART RATE: 81 BPM

## 2023-07-26 DIAGNOSIS — E03.9 HYPOTHYROIDISM, UNSPECIFIED TYPE: ICD-10-CM

## 2023-07-26 DIAGNOSIS — M81.0 OSTEOPOROSIS OF LUMBAR SPINE: ICD-10-CM

## 2023-07-26 DIAGNOSIS — E78.00 HYPERCHOLESTEREMIA: Primary | ICD-10-CM

## 2023-07-26 DIAGNOSIS — I10 ESSENTIAL HYPERTENSION, BENIGN: ICD-10-CM

## 2023-07-26 PROCEDURE — 3079F DIAST BP 80-89 MM HG: CPT | Performed by: INTERNAL MEDICINE

## 2023-07-26 PROCEDURE — 1123F ACP DISCUSS/DSCN MKR DOCD: CPT | Performed by: INTERNAL MEDICINE

## 2023-07-26 PROCEDURE — 3074F SYST BP LT 130 MM HG: CPT | Performed by: INTERNAL MEDICINE

## 2023-07-26 PROCEDURE — 99214 OFFICE O/P EST MOD 30 MIN: CPT | Performed by: INTERNAL MEDICINE

## 2023-07-26 RX ORDER — IBANDRONATE SODIUM 150 MG/1
150 TABLET, FILM COATED ORAL
Qty: 3 TABLET | Refills: 2 | Status: SHIPPED | OUTPATIENT
Start: 2023-07-26

## 2023-07-26 RX ORDER — TRIAMTERENE AND HYDROCHLOROTHIAZIDE 37.5; 25 MG/1; MG/1
1 TABLET ORAL DAILY
Qty: 90 TABLET | Refills: 1 | Status: SHIPPED | OUTPATIENT
Start: 2023-07-26

## 2023-07-26 RX ORDER — ATORVASTATIN CALCIUM 20 MG/1
20 TABLET, FILM COATED ORAL DAILY
Qty: 90 TABLET | Refills: 1 | Status: SHIPPED | OUTPATIENT
Start: 2023-07-26

## 2023-07-26 RX ORDER — LEVOTHYROXINE SODIUM 0.07 MG/1
75 TABLET ORAL DAILY
Qty: 90 TABLET | Refills: 1 | Status: SHIPPED | OUTPATIENT
Start: 2023-07-26

## 2023-07-26 SDOH — ECONOMIC STABILITY: INCOME INSECURITY: HOW HARD IS IT FOR YOU TO PAY FOR THE VERY BASICS LIKE FOOD, HOUSING, MEDICAL CARE, AND HEATING?: NOT HARD AT ALL

## 2023-07-26 SDOH — ECONOMIC STABILITY: FOOD INSECURITY: WITHIN THE PAST 12 MONTHS, THE FOOD YOU BOUGHT JUST DIDN'T LAST AND YOU DIDN'T HAVE MONEY TO GET MORE.: NEVER TRUE

## 2023-07-26 SDOH — ECONOMIC STABILITY: FOOD INSECURITY: WITHIN THE PAST 12 MONTHS, YOU WORRIED THAT YOUR FOOD WOULD RUN OUT BEFORE YOU GOT MONEY TO BUY MORE.: NEVER TRUE

## 2023-07-26 SDOH — ECONOMIC STABILITY: HOUSING INSECURITY
IN THE LAST 12 MONTHS, WAS THERE A TIME WHEN YOU DID NOT HAVE A STEADY PLACE TO SLEEP OR SLEPT IN A SHELTER (INCLUDING NOW)?: NO

## 2023-07-26 NOTE — PROGRESS NOTES
[address] [facility]  [address] [facility], [address]   Phone: [phone]      Murali Bee (: 1950) is a 75 y.o. female, established patient, here for evaluation of the following chief complaint(s):  Follow-up (4 month follow up)     SUBJECTIVE/OBJECTIVE:  HPI:  Patient is being seen today for a 4 month follow. Patient had lab work done on 2023. Patient states that over all she feels good and denies any chest pains, headaches or shortness of breath at this time. She states that she has not had any ER, urgent care or hospital visits at this time. Patient needs refills on Levrothyoxine 75 mcg, Atorvastatin 20 mg, and Maxzide 37.5-25. Patient informed she had her thyroid dose has been increased to 75 mcg in May. Sister does state that she is feeling much better. Had stool test with [employer] at home last week and is negative  Current Outpatient Medications   Medication Sig    ibandronate (BONIVA) 150 MG tablet Take 1 tablet by mouth every 30 days    triamterene-hydroCHLOROthiazide (MAXZIDE-25) 37.5-25 MG per tablet Take 1 tablet by mouth daily    levothyroxine (SYNTHROID) 75 MCG tablet Take 1 tablet by mouth Daily    atorvastatin (LIPITOR) 20 MG tablet Take 1 tablet by mouth daily    acetaminophen (TYLENOL) 325 MG tablet Take 2 tablets by mouth every 4 hours as needed    aspirin 81 MG EC tablet Take 1 tablet by mouth daily    ferrous sulfate (IRON 325) 325 (65 Fe) MG tablet Take 1 tablet by mouth daily as needed     No current facility-administered medications for this visit.        No Known Allergies     Past Medical History:   Diagnosis Date    Abnormal brain MRI     Blepharospasm     Elevated fasting blood sugar     Hypercholesteremia     Hypertension     Low serum iron     occasional    Thyroid disease     hypothyroidism    Vitamin D deficiency         Family History   Problem Relation Age of Onset    Hypertension Sister     Anesth Problems Neg Hx     No

## 2023-08-16 LAB — TSH SERPL DL<=0.005 MIU/L-ACNC: 2.2 UIU/ML (ref 0.45–4.5)

## 2023-08-18 ENCOUNTER — TELEPHONE (OUTPATIENT)
Facility: CLINIC | Age: 73
End: 2023-08-18

## 2023-08-18 NOTE — TELEPHONE ENCOUNTER
Patient  called for lab results. He would like a call back. Patient does not have a current consent to discuss information on file.

## 2023-10-05 RX ORDER — AZELASTINE 1 MG/ML
SPRAY, METERED NASAL
Qty: 30 ML | Refills: 5 | Status: SHIPPED | OUTPATIENT
Start: 2023-10-05 | End: 2023-10-06 | Stop reason: SDUPTHER

## 2023-10-06 RX ORDER — AZELASTINE 1 MG/ML
SPRAY, METERED NASAL
Qty: 30 ML | Refills: 5 | Status: SHIPPED | OUTPATIENT
Start: 2023-10-06

## 2023-10-06 RX ORDER — ASPIRIN 81 MG/1
81 TABLET ORAL DAILY
Qty: 90 TABLET | Refills: 0 | Status: SHIPPED | OUTPATIENT
Start: 2023-10-06

## 2023-10-26 DIAGNOSIS — E78.00 HYPERCHOLESTEREMIA: ICD-10-CM

## 2023-10-26 DIAGNOSIS — E03.9 HYPOTHYROIDISM, UNSPECIFIED TYPE: ICD-10-CM

## 2023-10-26 DIAGNOSIS — I10 ESSENTIAL HYPERTENSION, BENIGN: ICD-10-CM

## 2023-11-09 LAB
ALBUMIN SERPL-MCNC: 4.1 G/DL (ref 3.8–4.8)
ALBUMIN/GLOB SERPL: 1.7 {RATIO} (ref 1.2–2.2)
ALP SERPL-CCNC: 104 IU/L (ref 44–121)
ALT SERPL-CCNC: 15 IU/L (ref 0–32)
AST SERPL-CCNC: 15 IU/L (ref 0–40)
BILIRUB SERPL-MCNC: 0.6 MG/DL (ref 0–1.2)
BUN SERPL-MCNC: 10 MG/DL (ref 8–27)
BUN/CREAT SERPL: 13 (ref 12–28)
CALCIUM SERPL-MCNC: 9.4 MG/DL (ref 8.7–10.3)
CHLORIDE SERPL-SCNC: 101 MMOL/L (ref 96–106)
CHOLEST SERPL-MCNC: 144 MG/DL (ref 100–199)
CO2 SERPL-SCNC: 24 MMOL/L (ref 20–29)
CREAT SERPL-MCNC: 0.75 MG/DL (ref 0.57–1)
EGFRCR SERPLBLD CKD-EPI 2021: 83 ML/MIN/1.73
GLOBULIN SER CALC-MCNC: 2.4 G/DL (ref 1.5–4.5)
GLUCOSE SERPL-MCNC: 87 MG/DL (ref 70–99)
HDLC SERPL-MCNC: 73 MG/DL
LDLC SERPL CALC-MCNC: 57 MG/DL (ref 0–99)
POTASSIUM SERPL-SCNC: 4.4 MMOL/L (ref 3.5–5.2)
PROT SERPL-MCNC: 6.5 G/DL (ref 6–8.5)
SODIUM SERPL-SCNC: 138 MMOL/L (ref 134–144)
TRIGL SERPL-MCNC: 73 MG/DL (ref 0–149)
TSH SERPL DL<=0.005 MIU/L-ACNC: 3.07 UIU/ML (ref 0.45–4.5)
VLDLC SERPL CALC-MCNC: 14 MG/DL (ref 5–40)

## 2023-11-28 ENCOUNTER — OFFICE VISIT (OUTPATIENT)
Facility: CLINIC | Age: 73
End: 2023-11-28
Payer: COMMERCIAL

## 2023-11-28 VITALS
WEIGHT: 133 LBS | DIASTOLIC BLOOD PRESSURE: 62 MMHG | TEMPERATURE: 97.8 F | HEIGHT: 62 IN | BODY MASS INDEX: 24.48 KG/M2 | HEART RATE: 83 BPM | SYSTOLIC BLOOD PRESSURE: 124 MMHG | OXYGEN SATURATION: 98 %

## 2023-11-28 DIAGNOSIS — E78.00 HYPERCHOLESTEREMIA: ICD-10-CM

## 2023-11-28 DIAGNOSIS — E03.9 HYPOTHYROIDISM, UNSPECIFIED TYPE: ICD-10-CM

## 2023-11-28 DIAGNOSIS — I10 ESSENTIAL HYPERTENSION, BENIGN: ICD-10-CM

## 2023-11-28 DIAGNOSIS — Z00.00 MEDICARE ANNUAL WELLNESS VISIT, SUBSEQUENT: Primary | ICD-10-CM

## 2023-11-28 PROBLEM — H35.379 EPIRETINAL MEMBRANE: Status: ACTIVE | Noted: 2023-11-28

## 2023-11-28 PROCEDURE — 99214 OFFICE O/P EST MOD 30 MIN: CPT | Performed by: INTERNAL MEDICINE

## 2023-11-28 PROCEDURE — 3074F SYST BP LT 130 MM HG: CPT | Performed by: INTERNAL MEDICINE

## 2023-11-28 PROCEDURE — G0439 PPPS, SUBSEQ VISIT: HCPCS | Performed by: INTERNAL MEDICINE

## 2023-11-28 PROCEDURE — 3078F DIAST BP <80 MM HG: CPT | Performed by: INTERNAL MEDICINE

## 2023-11-28 PROCEDURE — 1123F ACP DISCUSS/DSCN MKR DOCD: CPT | Performed by: INTERNAL MEDICINE

## 2023-11-28 RX ORDER — TRIAMTERENE AND HYDROCHLOROTHIAZIDE 37.5; 25 MG/1; MG/1
1 TABLET ORAL DAILY
Qty: 90 TABLET | Refills: 1 | Status: SHIPPED | OUTPATIENT
Start: 2023-11-28

## 2023-11-28 RX ORDER — LEVOTHYROXINE SODIUM 0.07 MG/1
75 TABLET ORAL DAILY
Qty: 90 TABLET | Refills: 1 | Status: SHIPPED | OUTPATIENT
Start: 2023-11-28

## 2023-11-28 RX ORDER — ATORVASTATIN CALCIUM 20 MG/1
20 TABLET, FILM COATED ORAL DAILY
Qty: 90 TABLET | Refills: 1 | Status: SHIPPED | OUTPATIENT
Start: 2023-11-28

## 2023-11-28 ASSESSMENT — PATIENT HEALTH QUESTIONNAIRE - PHQ9
SUM OF ALL RESPONSES TO PHQ QUESTIONS 1-9: 0
SUM OF ALL RESPONSES TO PHQ QUESTIONS 1-9: 0
1. LITTLE INTEREST OR PLEASURE IN DOING THINGS: 0
SUM OF ALL RESPONSES TO PHQ QUESTIONS 1-9: 0
SUM OF ALL RESPONSES TO PHQ9 QUESTIONS 1 & 2: 0
SUM OF ALL RESPONSES TO PHQ QUESTIONS 1-9: 0
2. FEELING DOWN, DEPRESSED OR HOPELESS: 0

## 2023-11-28 ASSESSMENT — LIFESTYLE VARIABLES
HOW OFTEN DO YOU HAVE A DRINK CONTAINING ALCOHOL: NEVER
HOW MANY STANDARD DRINKS CONTAINING ALCOHOL DO YOU HAVE ON A TYPICAL DAY: PATIENT DOES NOT DRINK

## 2023-11-28 NOTE — PROGRESS NOTES
Medicare Annual Wellness Visit    [address] [place] is here for Discuss Labs and Medicare AWV    Assessment & Plan   Medicare annual wellness visit, subsequent  -     [facility] -  Referral to [facility] Clinical Specialist  -     Tdap (ADACEL) 5-2-15.5 LF-MCG/0.5 injection; Inject 0.5 mLs into the muscle once for 1 dose, Disp-0.5 mL, R-0Normal  Hypothyroidism, unspecified type  -     levothyroxine (SYNTHROID) 75 MCG tablet; Take 1 tablet by mouth Daily, Disp-90 tablet, R-1Normal  -     TSH; Future  Hypercholesteremia  -     atorvastatin (LIPITOR) 20 MG tablet; Take 1 tablet by mouth daily, Disp-90 tablet, R-1Normal  -     CBC with Auto Differential; Future  -     Lipid Panel; Future  -     Comprehensive Metabolic Panel; Future  Essential hypertension, benign  -     triamterene-hydroCHLOROthiazide (MAXZIDE-25) 37.5-25 MG per tablet; Take 1 tablet by mouth daily, Disp-90 tablet, R-1Normal  -     Comprehensive Metabolic Panel; Future  Recommendations for Preventive Services Due: see orders and patient instructions/AVS.  Recommended screening schedule for the next 5-10 years is provided to the patient in written form: see Patient Instructions/AVS.     Return in about 4 months (around 3/28/2024), or if symptoms worsen or fail to improve, for labs. Subjective   Patient is seen today for Medicare physical and follow-up of her recent labs done on 11/8/2023. Patient stated she has been good, had flu vaccine October 2023 at [place]. She stated she had a stool test with insurance company this year and it was negative. Patient informed she does not have a living will, patient  wishes  to be fully resuscitated and her  Reena Delcid  is  the [address] [place] [address]. She states she needs refill for all of her medications. 11/8/2023, glucose 87, BUN 75, potassium 4.4, AST ALT 15 cholesterol 144, triglyceride 73, HDL 70, LDL 57, TSH 3.070.   Past Medical History:   Diagnosis Date    Abnormal brain MRI     Blepharospasm

## 2023-11-28 NOTE — PROGRESS NOTES
Chief Complaint   Patient presents with    Discuss Labs    Medicare AWV         1. \"Have you been to the ER, urgent care clinic since your last visit? Hospitalized since your last visit? \" No    2. \"Have you seen or consulted any other health care providers outside of the [address] [place] [address] since your last visit? \" No     3. For patients aged 43-73: Has the patient had a colonoscopy / FIT/ Cologuard? No      If the patient is female:    4. For patients aged 43-66: Has the patient had a mammogram within the past 2 years? Yes - Care Gap present. Most recent result on file      5. For patients aged 21-65: Has the patient had a pap smear?  No

## 2023-11-29 ENCOUNTER — CLINICAL DOCUMENTATION (OUTPATIENT)
Dept: SPIRITUAL SERVICES | Age: 73
End: 2023-11-29

## 2023-11-29 NOTE — ACP (ADVANCE CARE PLANNING)
Advance Care Planning   Ambulatory ACP Specialist Patient Outreach    Date:  11/29/2023    ACP Specialist:  Keyon Pulido    Outreach call to patient in follow-up to ACP Specialist referral from:Maikel Britt MD    [x] PCP  [] Provider   [] Ambulatory Care Management [] Other     For:                  [x] Advance Directive Assistance              [] Complete Portable DNR order              [] Complete POST/POLST/MOST              [] Code Status Discussion             [] Discuss Goals of Care             [] Early ACP Decision-Making              [] Other (Specify)    Date Referral Received: 11/28/2023    Next Step:   [] ACP scheduled conversation  [x] Outreach again in 7-10 business days            [] Email / Mail [address]  [] Email / Mail Advance Directive   [] Closing referral.  Routing closure to referring provider/staff and to ACP Specialist . [] Closure letter mailed to patient with invitation to contact ACP Specialist if / when ready. [] Other (Specify here):         [x] At this time, Healthcare Decision Maker Is:    Advance Care Planning   Healthcare Decision Maker:    Primary Decision Maker: Amilcar - [phone]    Secondary Decision Maker: Hanna Padilla - [facility] [phone]      [] Primary agent named in scanned advance directive. [x] Legal Next of Kin. [] Unable to determine legal decision maker at this time. Outreaches:       [x] 1st -  Date:  11/29/2023               Intervention:  [] Spoke with Patient   [x] Left Voice mail [] Email / Mail    [] [email or web address]  [] Other [id number]) : Outcomes: Attempted ACP outreach to number listed for both home and cell - no answer. Left VM requesting return call regarding referral received by ACP team from provider's office. [] 2nd -  Date:                 Intervention:  [] Spoke with Patient  [] Left Voice mail [] Email / Mail    [] [email or web address]  [] Other [phone] :               Outcomes:

## 2024-02-08 DIAGNOSIS — I10 ESSENTIAL HYPERTENSION, BENIGN: ICD-10-CM

## 2024-02-08 DIAGNOSIS — E03.9 HYPOTHYROIDISM, UNSPECIFIED TYPE: ICD-10-CM

## 2024-02-08 RX ORDER — TRIAMTERENE AND HYDROCHLOROTHIAZIDE 37.5; 25 MG/1; MG/1
1 TABLET ORAL DAILY
Qty: 90 TABLET | Refills: 1 | Status: SHIPPED | OUTPATIENT
Start: 2024-02-08

## 2024-02-08 RX ORDER — LEVOTHYROXINE SODIUM 0.07 MG/1
75 TABLET ORAL DAILY
Qty: 90 TABLET | Refills: 1 | Status: SHIPPED | OUTPATIENT
Start: 2024-02-08

## 2024-02-17 DIAGNOSIS — E78.00 HYPERCHOLESTEREMIA: ICD-10-CM

## 2024-02-17 RX ORDER — ATORVASTATIN CALCIUM 20 MG/1
20 TABLET, FILM COATED ORAL DAILY
Qty: 90 TABLET | Refills: 1 | Status: SHIPPED | OUTPATIENT
Start: 2024-02-17

## 2024-03-20 LAB
ALBUMIN SERPL-MCNC: 4.3 G/DL (ref 3.8–4.8)
ALBUMIN/GLOB SERPL: 1.9 {RATIO} (ref 1.2–2.2)
ALP SERPL-CCNC: 93 IU/L (ref 44–121)
ALT SERPL-CCNC: 20 IU/L (ref 0–32)
AST SERPL-CCNC: 19 IU/L (ref 0–40)
BASOPHILS # BLD AUTO: 0.1 X10E3/UL (ref 0–0.2)
BASOPHILS NFR BLD AUTO: 1 %
BILIRUB SERPL-MCNC: 1 MG/DL (ref 0–1.2)
BUN SERPL-MCNC: 9 MG/DL (ref 8–27)
BUN/CREAT SERPL: 12 (ref 12–28)
CALCIUM SERPL-MCNC: 9.6 MG/DL (ref 8.7–10.3)
CHLORIDE SERPL-SCNC: 100 MMOL/L (ref 96–106)
CHOLEST SERPL-MCNC: 156 MG/DL (ref 100–199)
CO2 SERPL-SCNC: 23 MMOL/L (ref 20–29)
CREAT SERPL-MCNC: 0.77 MG/DL (ref 0.57–1)
EGFRCR SERPLBLD CKD-EPI 2021: 81 ML/MIN/1.73
EOSINOPHIL # BLD AUTO: 0.1 X10E3/UL (ref 0–0.4)
EOSINOPHIL NFR BLD AUTO: 2 %
ERYTHROCYTE [DISTWIDTH] IN BLOOD BY AUTOMATED COUNT: 12.7 % (ref 11.7–15.4)
GLOBULIN SER CALC-MCNC: 2.3 G/DL (ref 1.5–4.5)
GLUCOSE SERPL-MCNC: 92 MG/DL (ref 70–99)
HCT VFR BLD AUTO: 41.7 % (ref 34–46.6)
HDLC SERPL-MCNC: 76 MG/DL
HGB BLD-MCNC: 13.6 G/DL (ref 11.1–15.9)
IMM GRANULOCYTES # BLD AUTO: 0 X10E3/UL (ref 0–0.1)
IMM GRANULOCYTES NFR BLD AUTO: 0 %
LDLC SERPL CALC-MCNC: 64 MG/DL (ref 0–99)
LYMPHOCYTES # BLD AUTO: 1.7 X10E3/UL (ref 0.7–3.1)
LYMPHOCYTES NFR BLD AUTO: 27 %
MCH RBC QN AUTO: 27.6 PG (ref 26.6–33)
MCHC RBC AUTO-ENTMCNC: 32.6 G/DL (ref 31.5–35.7)
MCV RBC AUTO: 85 FL (ref 79–97)
MONOCYTES # BLD AUTO: 0.5 X10E3/UL (ref 0.1–0.9)
MONOCYTES NFR BLD AUTO: 8 %
NEUTROPHILS # BLD AUTO: 3.9 X10E3/UL (ref 1.4–7)
NEUTROPHILS NFR BLD AUTO: 62 %
PLATELET # BLD AUTO: 307 X10E3/UL (ref 150–450)
POTASSIUM SERPL-SCNC: 4.9 MMOL/L (ref 3.5–5.2)
PROT SERPL-MCNC: 6.6 G/DL (ref 6–8.5)
RBC # BLD AUTO: 4.93 X10E6/UL (ref 3.77–5.28)
SODIUM SERPL-SCNC: 137 MMOL/L (ref 134–144)
TRIGL SERPL-MCNC: 83 MG/DL (ref 0–149)
TSH SERPL DL<=0.005 MIU/L-ACNC: 2.05 UIU/ML (ref 0.45–4.5)
VLDLC SERPL CALC-MCNC: 16 MG/DL (ref 5–40)
WBC # BLD AUTO: 6.3 X10E3/UL (ref 3.4–10.8)

## 2024-03-28 ENCOUNTER — OFFICE VISIT (OUTPATIENT)
Facility: CLINIC | Age: 74
End: 2024-03-28
Payer: COMMERCIAL

## 2024-03-28 VITALS
TEMPERATURE: 97.8 F | SYSTOLIC BLOOD PRESSURE: 120 MMHG | DIASTOLIC BLOOD PRESSURE: 80 MMHG | RESPIRATION RATE: 16 BRPM | HEIGHT: 62 IN | HEART RATE: 80 BPM | BODY MASS INDEX: 24.51 KG/M2 | WEIGHT: 133.2 LBS | OXYGEN SATURATION: 98 %

## 2024-03-28 DIAGNOSIS — E78.00 HYPERCHOLESTEREMIA: ICD-10-CM

## 2024-03-28 DIAGNOSIS — M81.0 OSTEOPOROSIS OF LUMBAR SPINE: ICD-10-CM

## 2024-03-28 DIAGNOSIS — I10 ESSENTIAL HYPERTENSION, BENIGN: ICD-10-CM

## 2024-03-28 DIAGNOSIS — E78.00 HYPERCHOLESTEREMIA: Primary | ICD-10-CM

## 2024-03-28 DIAGNOSIS — E03.9 HYPOTHYROIDISM, UNSPECIFIED TYPE: ICD-10-CM

## 2024-03-28 DIAGNOSIS — Z12.31 BREAST CANCER SCREENING BY MAMMOGRAM: ICD-10-CM

## 2024-03-28 PROCEDURE — 3074F SYST BP LT 130 MM HG: CPT | Performed by: INTERNAL MEDICINE

## 2024-03-28 PROCEDURE — 3079F DIAST BP 80-89 MM HG: CPT | Performed by: INTERNAL MEDICINE

## 2024-03-28 PROCEDURE — 99214 OFFICE O/P EST MOD 30 MIN: CPT | Performed by: INTERNAL MEDICINE

## 2024-03-28 PROCEDURE — 1123F ACP DISCUSS/DSCN MKR DOCD: CPT | Performed by: INTERNAL MEDICINE

## 2024-03-28 ASSESSMENT — PATIENT HEALTH QUESTIONNAIRE - PHQ9
1. LITTLE INTEREST OR PLEASURE IN DOING THINGS: NOT AT ALL
SUM OF ALL RESPONSES TO PHQ QUESTIONS 1-9: 0
SUM OF ALL RESPONSES TO PHQ QUESTIONS 1-9: 0
SUM OF ALL RESPONSES TO PHQ9 QUESTIONS 1 & 2: 0
SUM OF ALL RESPONSES TO PHQ QUESTIONS 1-9: 0
SUM OF ALL RESPONSES TO PHQ QUESTIONS 1-9: 0
2. FEELING DOWN, DEPRESSED OR HOPELESS: NOT AT ALL

## 2024-03-28 NOTE — PROGRESS NOTES
shortness of breath.    Cardiovascular:  Negative for chest pain and palpitations.   Gastrointestinal:  Negative for abdominal pain, diarrhea, nausea and vomiting.   Endocrine: Negative for cold intolerance and polyuria.   Genitourinary:  Negative for dysuria and hematuria.   Musculoskeletal:  Negative for back pain and neck pain.   Skin:  Negative for rash.   Neurological:  Negative for dizziness and headaches.   Psychiatric/Behavioral: Negative.     /80 (Site: Left Upper Arm, Position: Sitting)   Pulse 80   Temp 97.8 °F (36.6 °C) (Oral)   Resp 16   Ht 1.575 m (5' 2\")   Wt 60.4 kg (133 lb 3.2 oz)   SpO2 98%   BMI 24.36 kg/m²      Physical Exam  Constitutional:       Appearance: Elderly patient with her  Daphnie.  HENT:      Head: Normocephalic and atraumatic.      Right Ear: External ear normal.      Left Ear: External ear normal.      Nose: Nose normal.      Mouth/Throat:      Mouth: Mucous membranes are moist.   Eyes:      Extraocular Movements: Extraocular movements intact.      Pupils: Pupils are equal, round, and reactive to light.   Cardiovascular:      Rate and Rhythm: Normal rate and regular rhythm.   Pulmonary:      Effort: Pulmonary effort is normal.      Breath sounds: Normal breath sounds.   Breast examination shows no nipple discharge, no mass palpated  Abdominal:      Palpations: Abdomen is soft.      Tenderness: There is no abdominal tenderness.   Musculoskeletal:      Cervical back: Normal range of motion and neck supple.      Right lower leg: No edema.      Left lower leg: No edema.   Skin:     General: Skin is warm and dry.   Neurological:      General: No focal deficit present.      Mental Status: She   is alert and oriented to person, place, and time.   Psychiatric:         Mood and Affect: Mood normal.    ASSESSMENT/PLAN:  Below is the assessment and plan developed based on review of pertinent history, physical exam, labs, studies, and medications.    1.

## 2024-05-02 RX ORDER — IBANDRONATE SODIUM 150 MG/1
150 TABLET, FILM COATED ORAL
Qty: 3 TABLET | Refills: 3 | Status: SHIPPED | OUTPATIENT
Start: 2024-05-02

## 2024-05-17 ENCOUNTER — HOSPITAL ENCOUNTER (OUTPATIENT)
Facility: HOSPITAL | Age: 74
Discharge: HOME | End: 2024-05-17
Payer: COMMERCIAL

## 2024-05-17 DIAGNOSIS — Z12.31 BREAST CANCER SCREENING BY MAMMOGRAM: ICD-10-CM

## 2024-05-17 PROCEDURE — 77063 BREAST TOMOSYNTHESIS BI: CPT

## 2024-06-20 ENCOUNTER — TELEPHONE (OUTPATIENT)
Facility: CLINIC | Age: 74
End: 2024-06-20

## 2024-06-20 NOTE — TELEPHONE ENCOUNTER
Josemanuel Hastings NP and [facility] states pt had test done yesterday shows moderate PAD in both legs.KAYLEY WINSTON MA

## 2024-07-03 DIAGNOSIS — I10 ESSENTIAL HYPERTENSION, BENIGN: ICD-10-CM

## 2024-07-03 DIAGNOSIS — E03.9 HYPOTHYROIDISM, UNSPECIFIED TYPE: ICD-10-CM

## 2024-07-03 RX ORDER — TRIAMTERENE AND HYDROCHLOROTHIAZIDE 37.5; 25 MG/1; MG/1
1 TABLET ORAL DAILY
Qty: 90 TABLET | Refills: 1 | Status: SHIPPED | OUTPATIENT
Start: 2024-07-03

## 2024-07-03 RX ORDER — LEVOTHYROXINE SODIUM 0.07 MG/1
75 TABLET ORAL DAILY
Qty: 90 TABLET | Refills: 1 | Status: SHIPPED | OUTPATIENT
Start: 2024-07-03

## 2024-07-05 ENCOUNTER — HOSPITAL ENCOUNTER (OUTPATIENT)
Facility: HOSPITAL | Age: 74
Setting detail: OUTPATIENT SURGERY
Discharge: HOME | End: 2024-07-05
Attending: SPECIALIST | Admitting: SPECIALIST
Payer: COMMERCIAL

## 2024-07-05 ENCOUNTER — ANESTHESIA EVENT (OUTPATIENT)
Facility: HOSPITAL | Age: 74
End: 2024-07-05
Payer: COMMERCIAL

## 2024-07-05 ENCOUNTER — ANESTHESIA (OUTPATIENT)
Facility: HOSPITAL | Age: 74
End: 2024-07-05
Payer: COMMERCIAL

## 2024-07-05 VITALS
RESPIRATION RATE: 19 BRPM | WEIGHT: 131.84 LBS | BODY MASS INDEX: 24.26 KG/M2 | TEMPERATURE: 97.5 F | HEIGHT: 62 IN | HEART RATE: 68 BPM | DIASTOLIC BLOOD PRESSURE: 91 MMHG | OXYGEN SATURATION: 99 % | SYSTOLIC BLOOD PRESSURE: 134 MMHG

## 2024-07-05 PROCEDURE — 7100000010 HC PHASE II RECOVERY - FIRST 15 MIN: Performed by: SPECIALIST

## 2024-07-05 PROCEDURE — 3700000000 HC ANESTHESIA ATTENDED CARE: Performed by: SPECIALIST

## 2024-07-05 PROCEDURE — 2709999900 HC NON-CHARGEABLE SUPPLY: Performed by: SPECIALIST

## 2024-07-05 PROCEDURE — 2500000003 HC RX 250 WO HCPCS: Performed by: NURSE ANESTHETIST, CERTIFIED REGISTERED

## 2024-07-05 PROCEDURE — 6360000002 HC RX W HCPCS: Performed by: NURSE ANESTHETIST, CERTIFIED REGISTERED

## 2024-07-05 PROCEDURE — 3600007502: Performed by: SPECIALIST

## 2024-07-05 PROCEDURE — 7100000011 HC PHASE II RECOVERY - ADDTL 15 MIN: Performed by: SPECIALIST

## 2024-07-05 PROCEDURE — 88305 TISSUE EXAM BY PATHOLOGIST: CPT

## 2024-07-05 PROCEDURE — 2580000003 HC RX 258: Performed by: SPECIALIST

## 2024-07-05 PROCEDURE — 88342 IMHCHEM/IMCYTCHM 1ST ANTB: CPT

## 2024-07-05 RX ORDER — SODIUM CHLORIDE 0.9 % (FLUSH) 0.9 %
5-40 SYRINGE (ML) INJECTION PRN
Status: DISCONTINUED | OUTPATIENT
Start: 2024-07-05 | End: 2024-07-05 | Stop reason: HOSPADM

## 2024-07-05 RX ORDER — PROPOFOL 10 MG/ML
INJECTION, EMULSION INTRAVENOUS PRN
Status: DISCONTINUED | OUTPATIENT
Start: 2024-07-05 | End: 2024-07-05 | Stop reason: SDUPTHER

## 2024-07-05 RX ORDER — SODIUM CHLORIDE 9 MG/ML
INJECTION, SOLUTION INTRAVENOUS CONTINUOUS
Status: DISCONTINUED | OUTPATIENT
Start: 2024-07-05 | End: 2024-07-05 | Stop reason: HOSPADM

## 2024-07-05 RX ADMIN — PROPOFOL 80 MG: 10 INJECTION, EMULSION INTRAVENOUS at 09:13

## 2024-07-05 RX ADMIN — LIDOCAINE HYDROCHLORIDE 60 MG: 20 INJECTION, SOLUTION INFILTRATION; PERINEURAL at 09:13

## 2024-07-05 RX ADMIN — PROPOFOL 30 MG: 10 INJECTION, EMULSION INTRAVENOUS at 09:16

## 2024-07-05 RX ADMIN — PROPOFOL 20 MG: 10 INJECTION, EMULSION INTRAVENOUS at 09:15

## 2024-07-05 RX ADMIN — SODIUM CHLORIDE: 9 INJECTION, SOLUTION INTRAVENOUS at 09:00

## 2024-07-05 ASSESSMENT — PAIN - FUNCTIONAL ASSESSMENT: PAIN_FUNCTIONAL_ASSESSMENT: 0-10

## 2024-07-05 NOTE — OP NOTE
[facility]  [facility]  Jose R Munoz MD  [phone]      2024    Esophagogastroduodenoscopy (EGD) Procedure Note  Cara Padilla  : 1950  [facility] Medical Record Number: [id number]      Indications:   Epigastric pain  Referring Physician:  Kristi Britt MD  Anesthesia/Sedation:  Conscious sedation/deep sedation/monitored anesthesia -- see notes.  Endoscopist:  Dr. Jose R Munoz  Complications:  None  Estimated Blood Loss:  None    Permit:  The indications, risks, benefits and alternatives were reviewed with the patient or their decision maker who was provided an opportunity to ask questions and all questions were answered.  The specific risks of esophagogastroduodenoscopy with conscious sedation were reviewed, including but not limited to anesthetic complication, bleeding, adverse drug reaction, missed lesion, infection, IV site reactions, and intestinal perforation which would lead to the need for surgical repair.  Alternatives to EGD including radiographic imaging, observation without testing, or laboratory testing were reviewed as well as the limitations of those alternatives discussed.  After considering the options and having all their questions answered, the patient or their decision maker provided both verbal and written consent to proceed.       Procedure in Detail:  After obtaining informed consent, positioning of the patient in the left lateral decubitus position, and conduction of a pre-procedure pause or \"time out\" the endoscope was introduced into the mouth and advanced to the duodenum.  A careful inspection was made, and findings or interventions are described below.    Findings:   Esophagus:normal  Stomach: Mild gastric erythema in antrum with a prepyloric diverticulum.  Cold forceps biopsies of antrum taken for histology.  Duodenum/jejunum:  Duodenal erythema and a single 2mm ulcer just  past the pylorus in the bulb, no bleeding or stigmata.  Cold forceps biopsies of duodenal mucosa taken for histology.      Specimens: See above    Impression: Duodenitis, antritis, duodenal ulcer (small).           Recommendations:  -Acid suppression with a proton pump inhibitor., -Await pathology.      Thank you for entrusting me with this patient's care.  Please do not hesitate to contact me with any questions or if I can be of assistance with any of your other patients' GI needs.  Signed By: Jose R Munoz MD                        July 5, 2024     Surgical assistant none.  Implants none unless specified.

## 2024-07-05 NOTE — PERIOP NOTE
Nursing report given to Priscila Pineda RN. Patient tolerated procedure without problems. Abdomen soft and patient arousable and voices no complaints Report received from Diamond Claire CRNA, see anesthesia note. Patient transported to endoscopy recovery area.  Scope pre-cleaned at bedside by Juan Pablo.

## 2024-07-05 NOTE — DISCHARGE INSTRUCTIONS
[facility]  [facility]  Jose R Monte MD  [phone]      July 5, 2024     Cara Padilla  YOB: 1950    ENDOSCOPY DISCHARGE INSTRUCTIONS    If there is redness at IV site you should apply warm compress to area.  If redness or soreness persist contact Dr. Monte' or your primary care doctor.    Gaseous discomfort may develop, but walking, belching will help relieve this.  You may not operate a vehicle for 12 hours  You may not operate machinery or dangerous appliances for rest of today  You may not drink alcoholic beverages for 12 hours  Avoid making any critical decisions for 24 hours    DIET:  You may resume your normal diet, but some patients find that heavy or large meals may lead to indigestion or vomiting.  I suggest a light meal as first food intake.    MEDICATIONS:  The use of some over-the-counter pain medication may lead to bleeding after biopsies or other procedures you may have had done.  Tylenol (also called acetaminophen) is safe to take and will not lead to bleeding.  Based on the procedure you had today you may not safely take aspirin or aspirin-like products for the next ten (10) days.      ACTIVITY:  You may resume your normal household activities, but it is recommended that you spend the remainder of the day resting -  avoid any strenuous activity.    CALL DR. MONTE' OFFICE IF:  Increasing pain, nausea, vomiting  Abdominal distension (swelling)  Significant new or increased bleeding (oral or rectal)  Fever/Chills  Chest pain/shortness of breath                   Additional instructions:   Hold aspirin 10 days.  We found some irritation of the stomach and first part of the small intestine with one small ulcer as well.  I took samples/biopsies and will contact you with the biopsy results in 10-14 days.  I want you to take ulcer medication (pantoprazole) for 2 months as I this will heal the

## 2024-07-05 NOTE — H&P
Pre-Endoscopy H&P Update  Chief complaint/HPI/ROS:  The indication for the procedure, the patient's history and the patient's current medications are reviewed prior to the procedure and that data is reported on the H&P to which this document is attached.  Any significant complaints with regard to organ systems will be noted, and if not mentioned then a review of systems is not contributory.  Past Medical History:   Diagnosis Date    Abnormal brain MRI     Blepharospasm     Elevated fasting blood sugar     Hypercholesteremia     Hypertension     Low serum iron     occasional    Thyroid disease     hypothyroidism    Vitamin D deficiency       Past Surgical History:   Procedure Laterality Date    BRAIN SURGERY      2019    CATARACT REMOVAL      HEENT Bilateral      Social   Social History     Tobacco Use    Smoking status: Former     Current packs/day: 0.00     Average packs/day: 1 pack/day for 5.0 years (5.0 ttl pk-yrs)     Types: Cigarettes     Start date: 1985     Quit date: 1990     Years since quittin.5    Smokeless tobacco: Never   Substance Use Topics    Alcohol use: Never      Family History   Problem Relation Age of Onset    Hypertension Sister     Anesth Problems Neg Hx     No Known Problems Daughter     Hypertension Sister     No Known Problems Other     Thyroid Disease Brother     Hypertension Brother     Asthma Father     Hypertension Mother       No Known Allergies   Prior to Admission Medications   Prescriptions Last Dose Informant Patient Reported? Taking?   acetaminophen (TYLENOL) 325 MG tablet Past Month  Yes No   Sig: Take 2 tablets by mouth every 4 hours as needed   aspirin 81 MG EC tablet 2024  No No   Sig: Take 1 tablet by mouth daily   atorvastatin (LIPITOR) 20 MG tablet Past Week  No No   Sig: TAKE 1 TABLET EVERY DAY   azelastine (ASTELIN) 0.1 % nasal spray Past Week  No No   Sig: PUT 1 SPRAY TWICE DAILY IN BOTH NOSTRILS   ferrous sulfate (IRON 325) 325 (65 Fe) MG tablet Not 
Consider PPI if not getting significant relief with H2B. Discussed food triggers as well.       Plan: EGD  Upper Endoscopy (EGD): options, risks, benefits and complications of bleeding, tear, surgical repair (1:1000) and reaction to medication, aspiration, and pneumonia explained to patient. 5% chance of missing lesions explained. All questions answered.  Diet and GERD      Risk & Medical Necessity: The level of medical decision making for this visit is moderate. The number and complexity of problems addressed are moderate. The amount and/or complexity of data to be reviewed and analyzed is minimal. The risk of complications and/or morbidity or mortality of patient management is moderate.               ELVA Anderson     Electronically signed on 2024 2:12:59 PM by ELVA Anderson, MRN [id number],  1950 IPP First Visit, 2024

## 2024-07-05 NOTE — ANESTHESIA PRE PROCEDURE
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   • Epiretinal membrane H35.379       Past Medical History:        Diagnosis Date   • Abnormal brain MRI    • Blepharospasm    • Elevated fasting blood sugar    • Hypercholesteremia    • Hypertension    • Low serum iron     occasional   • Thyroid disease     hypothyroidism   • Vitamin D deficiency        Past Surgical History:        Procedure Laterality Date   • BRAIN SURGERY      2019   • CATARACT REMOVAL     • HEENT Bilateral        Social History:    Social History     Tobacco Use   • Smoking status: Former     Current packs/day: 0.00     Average packs/day: 1 pack/day for 5.0 years (5.0 ttl pk-yrs)     Types: Cigarettes     Start date: 1985     Quit date: 1990     Years since quittin.5   • Smokeless tobacco: Never   Substance Use Topics   • Alcohol use: Never                                Counseling given: Not Answered      Vital Signs (Current):   Vitals:    24 0831   BP: 136/66   Pulse: 67   Resp: 18   Temp: 97.8 °F (36.6 °C)   TempSrc: Oral   SpO2: 98%   Weight: 59.8 kg (131 lb 13.4 oz)   Height: 1.575 m (5' 2\")                                              BP Readings from Last 3 Encounters:   24 136/66   24 120/80   23 124/62       NPO Status: Time of last liquid consumption: 400                        Time of last solid consumption:                         Date of last liquid consumption: 24                        Date of last solid food consumption: 24    BMI:   Wt Readings from Last 3 Encounters:   24 59.8 kg (131 lb 13.4 oz)   24 60.4 kg (133 lb 3.2 oz)   23 60.3 kg (133 lb)     Body mass index is 24.11 kg/m².    CBC:   Lab Results   Component Value Date/Time    WBC 6.3 2024 09:46 AM    RBC 4.93 2024 09:46 AM    HGB 13.6 2024 09:46 AM    HCT 41.7 2024 09:46 AM    MCV 85 2024 09:46 AM

## 2024-07-15 DIAGNOSIS — E78.00 HYPERCHOLESTEREMIA: ICD-10-CM

## 2024-07-15 RX ORDER — ATORVASTATIN CALCIUM 20 MG/1
20 TABLET, FILM COATED ORAL DAILY
Qty: 90 TABLET | Refills: 2 | Status: SHIPPED | OUTPATIENT
Start: 2024-07-15

## 2024-08-14 LAB
ALBUMIN SERPL-MCNC: 4 G/DL (ref 3.8–4.8)
ALP SERPL-CCNC: 79 IU/L (ref 44–121)
ALT SERPL-CCNC: 26 IU/L (ref 0–32)
AST SERPL-CCNC: 24 IU/L (ref 0–40)
BILIRUB SERPL-MCNC: 0.8 MG/DL (ref 0–1.2)
BUN SERPL-MCNC: 11 MG/DL (ref 8–27)
BUN/CREAT SERPL: 15 (ref 12–28)
CALCIUM SERPL-MCNC: 9.3 MG/DL (ref 8.7–10.3)
CHLORIDE SERPL-SCNC: 105 MMOL/L (ref 96–106)
CHOLEST SERPL-MCNC: 171 MG/DL (ref 100–199)
CO2 SERPL-SCNC: 24 MMOL/L (ref 20–29)
CREAT SERPL-MCNC: 0.73 MG/DL (ref 0.57–1)
EGFRCR SERPLBLD CKD-EPI 2021: 86 ML/MIN/1.73
GLOBULIN SER CALC-MCNC: 2.4 G/DL (ref 1.5–4.5)
GLUCOSE SERPL-MCNC: 89 MG/DL (ref 70–99)
HDLC SERPL-MCNC: 69 MG/DL
LDLC SERPL CALC-MCNC: 83 MG/DL (ref 0–99)
POTASSIUM SERPL-SCNC: 4.5 MMOL/L (ref 3.5–5.2)
PROT SERPL-MCNC: 6.4 G/DL (ref 6–8.5)
SODIUM SERPL-SCNC: 140 MMOL/L (ref 134–144)
TRIGL SERPL-MCNC: 104 MG/DL (ref 0–149)
TSH SERPL DL<=0.005 MIU/L-ACNC: 1.67 UIU/ML (ref 0.45–4.5)
VLDLC SERPL CALC-MCNC: 19 MG/DL (ref 5–40)

## 2024-08-29 ENCOUNTER — OFFICE VISIT (OUTPATIENT)
Facility: CLINIC | Age: 74
End: 2024-08-29
Payer: COMMERCIAL

## 2024-08-29 VITALS
BODY MASS INDEX: 24.84 KG/M2 | HEIGHT: 62 IN | OXYGEN SATURATION: 97 % | DIASTOLIC BLOOD PRESSURE: 70 MMHG | HEART RATE: 75 BPM | SYSTOLIC BLOOD PRESSURE: 124 MMHG | WEIGHT: 135 LBS

## 2024-08-29 DIAGNOSIS — R68.89 ABNORMALITY ON SCREENING TEST: ICD-10-CM

## 2024-08-29 DIAGNOSIS — Z12.11 COLON CANCER SCREENING: ICD-10-CM

## 2024-08-29 DIAGNOSIS — M25.562 PAIN IN BOTH KNEES, UNSPECIFIED CHRONICITY: ICD-10-CM

## 2024-08-29 DIAGNOSIS — Z12.11 COLON CANCER SCREENING: Primary | ICD-10-CM

## 2024-08-29 DIAGNOSIS — E03.9 HYPOTHYROIDISM, UNSPECIFIED TYPE: ICD-10-CM

## 2024-08-29 DIAGNOSIS — M25.561 PAIN IN BOTH KNEES, UNSPECIFIED CHRONICITY: ICD-10-CM

## 2024-08-29 DIAGNOSIS — I10 ESSENTIAL HYPERTENSION, BENIGN: ICD-10-CM

## 2024-08-29 DIAGNOSIS — E78.00 HYPERCHOLESTEREMIA: ICD-10-CM

## 2024-08-29 PROCEDURE — 99214 OFFICE O/P EST MOD 30 MIN: CPT | Performed by: INTERNAL MEDICINE

## 2024-08-29 PROCEDURE — 1123F ACP DISCUSS/DSCN MKR DOCD: CPT | Performed by: INTERNAL MEDICINE

## 2024-08-29 PROCEDURE — 3078F DIAST BP <80 MM HG: CPT | Performed by: INTERNAL MEDICINE

## 2024-08-29 PROCEDURE — 3074F SYST BP LT 130 MM HG: CPT | Performed by: INTERNAL MEDICINE

## 2024-08-29 SDOH — ECONOMIC STABILITY: FOOD INSECURITY: WITHIN THE PAST 12 MONTHS, YOU WORRIED THAT YOUR FOOD WOULD RUN OUT BEFORE YOU GOT MONEY TO BUY MORE.: NEVER TRUE

## 2024-08-29 SDOH — ECONOMIC STABILITY: FOOD INSECURITY: WITHIN THE PAST 12 MONTHS, THE FOOD YOU BOUGHT JUST DIDN'T LAST AND YOU DIDN'T HAVE MONEY TO GET MORE.: NEVER TRUE

## 2024-08-29 SDOH — ECONOMIC STABILITY: INCOME INSECURITY: HOW HARD IS IT FOR YOU TO PAY FOR THE VERY BASICS LIKE FOOD, HOUSING, MEDICAL CARE, AND HEATING?: NOT HARD AT ALL

## 2024-08-29 ASSESSMENT — ENCOUNTER SYMPTOMS
DIARRHEA: 0
COUGH: 0
VOMITING: 0
NAUSEA: 0
ABDOMINAL PAIN: 0
SHORTNESS OF BREATH: 0

## 2024-08-29 NOTE — PROGRESS NOTES
.  Chief Complaint   Patient presents with    Follow-up Chronic Condition    Hyperlipidemia    Discuss Labs     .  \"Have you been to the ER, urgent care clinic since your last visit?  Hospitalized since your last visit?\"    NO    “Have you seen or consulted any other health care providers outside of [facility] since your last visit?”    YES - When: approximately 1 months ago.  Where and Why: Dr. Munoz  at [facility] for Esophagogastroduodenoscopy .        “Have you had a colorectal cancer screening such as a colonoscopy/FIT/Cologuard?    NO    No colonoscopy on file  No cologuard on file  No FIT/FOBT on file   No flexible sigmoidoscopy on file     ./70 (Site: Right Upper Arm, Position: Sitting, Cuff Size: Medium Adult)   Pulse 75   Ht 1.575 m (5' 2\")   Wt 61.2 kg (135 lb)   SpO2 97%   BMI 24.69 kg/m²       Click Here for Release of Records Request

## 2024-08-29 NOTE — PROGRESS NOTES
[facility]  [address], [place] [address]  Phone: [phone]      Cara Padilla (: 1950) is a 74 y.o. female, established patient, here for evaluation of the following chief complaint(s):  Follow-up Chronic Condition, Hyperlipidemia, and Discuss Labs     SUBJECTIVE/OBJECTIVE:  History of Present Illness  The patient is a 74-year-old female with a past medical history of hypertension, hypercholesterolemia, hypothyroidism, and osteoporosis, who is seen today for a follow-up of her blood pressure and review of her blood test done on 2024. She is accompanied by her .    She reports no current health concerns.    However, she mentions that representatives from [facility] visited their home and conducted a circulation test on her leg, which indicated some weakness. She occasionally experiences pain in her legs during walking but does not have any knee pain. Additionally, she notes that her knees appear thicker  than usual, a change that was not present before.    She stated she will be going to [facility] in November and will be coming back in mid March        No results found for: \"LABA1C\"   Hemoglobin   Date Value Ref Range Status   2024 13.6 11.1 - 15.9 g/dL Final     Hematocrit   Date Value Ref Range Status   2024 41.7 34.0 - 46.6 % Final     Creatinine   Date Value Ref Range Status   2024 0.73 0.57 - 1.00 mg/dL Final     Glucose   Date Value Ref Range Status   2024 89 70 - 99 mg/dL Final     TSH, 3rd Generation   Date Value Ref Range Status   2022 3.620 NA Final     TSH   Date Value Ref Range Status   2024 1.670 0.450 - 4.500 uIU/mL Final     Potassium   Date Value Ref Range Status   2024 4.5 3.5 - 5.2 mmol/L Final     Cholesterol, Total   Date Value Ref Range Status   2024 171 100 - 199 mg/dL Final   2022 143 MG/DL Final     HDL   Date Value Ref Range Status   2024 69 >39 mg/dL Final  deficit present.      Mental Status: She   is alert and oriented to person, place, and time.   Psychiatric:         Mood and Affect: Mood normal.    ASSESSMENT/PLAN:  Below is the assessment and plan developed based on review of pertinent history, physical exam, labs, studies, and medications.  Assessment & Plan  1. Hypertension.  Her blood pressure readings are within the normal range. She should continue her current antihypertensive medication,maxzide. Regular monitoring of blood pressure at home is advised.    2. Hypercholesterolemia.  LDL was 83 in March 24 and LFTs normal in August 24.  Advised to continue current cholesterol-lowering medication, atorvastatin. A lipid panel will be repeated in 6 months to monitor cholesterol levels.    3. Hypothyroidism.  TSH is normal at 1.67, continue current thyroid hormone replacement therapy. Thyroid function tests will be repeated in 6 months to ensure adequate control.    4. Osteoporosis.  Advised to continue Boniva every month . She is advised to maintain a diet rich in calcium and vitamin D, and engage in weight-bearing exercises to strengthen bones.    5.  Knee pain and thickening feeling.  The patient reports thickening and occasional discomfort in her knees, likely arthritis. There is no calf swelling or tenderness. Advised heat locally and will check x-ray of the knees.      6.  Colon cancer screening.  Patient never had colonoscopy we will check a stool occult test    1. Colon cancer screening  -     Occult Blood Stool Immunoassay; Future  2. Abnormality on screening test  -     Vascular lower arterial complete physiologic Doppler at rest; Future  3. Pain in both knees, unspecified chronicity  -     XR KNEE LEFT (3 VIEWS); Future  -     XR KNEE RIGHT (3 VIEWS); Future  4. Hypercholesteremia  -     CBC with Auto Differential; Future  -     Comprehensive Metabolic Panel; Future  -     Lipid Panel; Future  5. Hypothyroidism, unspecified type  -     TSH; Future  6.

## 2024-09-04 ENCOUNTER — HOSPITAL ENCOUNTER (OUTPATIENT)
Facility: HOSPITAL | Age: 74
Discharge: HOME | End: 2024-09-07
Payer: COMMERCIAL

## 2024-09-04 ENCOUNTER — TRANSCRIBE ORDERS (OUTPATIENT)
Facility: HOSPITAL | Age: 74
End: 2024-09-04

## 2024-09-04 DIAGNOSIS — M87.88 KNEE PAIN WITH AVASCULAR NECROSIS DETERMINED BY X-RAY (HCC): Primary | ICD-10-CM

## 2024-09-04 DIAGNOSIS — M25.561 PAIN IN BOTH KNEES, UNSPECIFIED CHRONICITY: ICD-10-CM

## 2024-09-04 DIAGNOSIS — M25.562 PAIN IN BOTH KNEES, UNSPECIFIED CHRONICITY: ICD-10-CM

## 2024-09-04 DIAGNOSIS — M87.88 KNEE PAIN WITH AVASCULAR NECROSIS DETERMINED BY X-RAY (HCC): ICD-10-CM

## 2024-09-04 PROCEDURE — 73562 X-RAY EXAM OF KNEE 3: CPT

## 2024-09-10 ENCOUNTER — HOSPITAL ENCOUNTER (OUTPATIENT)
Dept: VASCULAR SURGERY | Facility: HOSPITAL | Age: 74
Discharge: HOME | End: 2024-09-12
Payer: COMMERCIAL

## 2024-09-10 DIAGNOSIS — R68.89 ABNORMALITY ON SCREENING TEST: ICD-10-CM

## 2024-09-10 PROCEDURE — 93923 UPR/LXTR ART STDY 3+ LVLS: CPT

## 2024-09-11 LAB
VAS LEFT ABI: 1.25
VAS LEFT ARM BP: 129 MMHG
VAS LEFT DORSALIS PEDIS BP: 131 MMHG
VAS LEFT LOW THIGH PRESSURE: 168 MMHG
VAS LEFT LOW THIGH PRESSURE: 66 MMHG
VAS LEFT PTA BP: 161 MMHG
VAS LEFT TBI: 0.94
VAS LEFT TOE PRESSURE: 121 MMHG
VAS RIGHT ABI: 1.16
VAS RIGHT ARM BP: 129 MMHG
VAS RIGHT DORSALIS PEDIS BP: 139 MMHG
VAS RIGHT LOW THIGH PRESSURE: 178 MMHG
VAS RIGHT LOW THIGH PRESSURE: 65 MMHG
VAS RIGHT PTA BP: 150 MMHG
VAS RIGHT TBI: 0.6
VAS RIGHT TOE PRESSURE: 78 MMHG

## 2024-09-18 ENCOUNTER — TELEPHONE (OUTPATIENT)
Facility: CLINIC | Age: 74
End: 2024-09-18

## 2025-02-28 DIAGNOSIS — I10 ESSENTIAL HYPERTENSION, BENIGN: ICD-10-CM

## 2025-02-28 DIAGNOSIS — E78.00 HYPERCHOLESTEREMIA: ICD-10-CM

## 2025-02-28 DIAGNOSIS — E03.9 HYPOTHYROIDISM, UNSPECIFIED TYPE: ICD-10-CM

## 2025-03-18 DIAGNOSIS — E03.9 HYPOTHYROIDISM, UNSPECIFIED TYPE: ICD-10-CM

## 2025-03-18 DIAGNOSIS — I10 ESSENTIAL HYPERTENSION, BENIGN: ICD-10-CM

## 2025-03-18 RX ORDER — LEVOTHYROXINE SODIUM 75 UG/1
75 TABLET ORAL DAILY
Qty: 90 TABLET | Refills: 1 | Status: SHIPPED | OUTPATIENT
Start: 2025-03-18

## 2025-03-18 RX ORDER — TRIAMTERENE AND HYDROCHLOROTHIAZIDE 37.5; 25 MG/1; MG/1
1 TABLET ORAL DAILY
Qty: 90 TABLET | Refills: 1 | Status: SHIPPED | OUTPATIENT
Start: 2025-03-18

## 2025-04-10 LAB
ALBUMIN SERPL-MCNC: 4.2 G/DL (ref 3.8–4.8)
ALP SERPL-CCNC: 78 IU/L (ref 44–121)
ALT SERPL-CCNC: 17 IU/L (ref 0–32)
AST SERPL-CCNC: 19 IU/L (ref 0–40)
BASOPHILS # BLD AUTO: 0.1 X10E3/UL (ref 0–0.2)
BASOPHILS NFR BLD AUTO: 2 %
BILIRUB SERPL-MCNC: 0.8 MG/DL (ref 0–1.2)
BUN SERPL-MCNC: 11 MG/DL (ref 8–27)
BUN/CREAT SERPL: 13 (ref 12–28)
CALCIUM SERPL-MCNC: 9.6 MG/DL (ref 8.7–10.3)
CHLORIDE SERPL-SCNC: 104 MMOL/L (ref 96–106)
CHOLEST SERPL-MCNC: 148 MG/DL (ref 100–199)
CO2 SERPL-SCNC: 22 MMOL/L (ref 20–29)
CREAT SERPL-MCNC: 0.87 MG/DL (ref 0.57–1)
EGFRCR SERPLBLD CKD-EPI 2021: 69 ML/MIN/1.73
EOSINOPHIL # BLD AUTO: 0.2 X10E3/UL (ref 0–0.4)
EOSINOPHIL NFR BLD AUTO: 4 %
ERYTHROCYTE [DISTWIDTH] IN BLOOD BY AUTOMATED COUNT: 13 % (ref 11.7–15.4)
GLOBULIN SER CALC-MCNC: 2.5 G/DL (ref 1.5–4.5)
GLUCOSE SERPL-MCNC: 92 MG/DL (ref 70–99)
HCT VFR BLD AUTO: 40.5 % (ref 34–46.6)
HDLC SERPL-MCNC: 66 MG/DL
HGB BLD-MCNC: 13 G/DL (ref 11.1–15.9)
IMM GRANULOCYTES # BLD AUTO: 0 X10E3/UL (ref 0–0.1)
IMM GRANULOCYTES NFR BLD AUTO: 0 %
LDLC SERPL CALC-MCNC: 67 MG/DL (ref 0–99)
LYMPHOCYTES # BLD AUTO: 1.8 X10E3/UL (ref 0.7–3.1)
LYMPHOCYTES NFR BLD AUTO: 41 %
MCH RBC QN AUTO: 28.1 PG (ref 26.6–33)
MCHC RBC AUTO-ENTMCNC: 32.1 G/DL (ref 31.5–35.7)
MCV RBC AUTO: 88 FL (ref 79–97)
MONOCYTES # BLD AUTO: 0.4 X10E3/UL (ref 0.1–0.9)
MONOCYTES NFR BLD AUTO: 9 %
NEUTROPHILS # BLD AUTO: 2 X10E3/UL (ref 1.4–7)
NEUTROPHILS NFR BLD AUTO: 44 %
PLATELET # BLD AUTO: 295 X10E3/UL (ref 150–450)
POTASSIUM SERPL-SCNC: 4.8 MMOL/L (ref 3.5–5.2)
PROT SERPL-MCNC: 6.7 G/DL (ref 6–8.5)
RBC # BLD AUTO: 4.63 X10E6/UL (ref 3.77–5.28)
SODIUM SERPL-SCNC: 141 MMOL/L (ref 134–144)
TRIGL SERPL-MCNC: 80 MG/DL (ref 0–149)
TSH SERPL DL<=0.005 MIU/L-ACNC: 4.37 UIU/ML (ref 0.45–4.5)
VLDLC SERPL CALC-MCNC: 15 MG/DL (ref 5–40)
WBC # BLD AUTO: 4.5 X10E3/UL (ref 3.4–10.8)

## 2025-04-16 ENCOUNTER — OFFICE VISIT (OUTPATIENT)
Facility: CLINIC | Age: 75
End: 2025-04-16
Payer: MEDICARE

## 2025-04-16 VITALS
RESPIRATION RATE: 16 BRPM | TEMPERATURE: 97.4 F | HEIGHT: 62 IN | HEART RATE: 71 BPM | WEIGHT: 137 LBS | BODY MASS INDEX: 25.21 KG/M2 | OXYGEN SATURATION: 98 % | DIASTOLIC BLOOD PRESSURE: 79 MMHG | SYSTOLIC BLOOD PRESSURE: 128 MMHG

## 2025-04-16 DIAGNOSIS — R73.01 IMPAIRED FASTING GLUCOSE: ICD-10-CM

## 2025-04-16 DIAGNOSIS — E03.9 HYPOTHYROIDISM, UNSPECIFIED TYPE: ICD-10-CM

## 2025-04-16 DIAGNOSIS — Z78.0 MENOPAUSE: ICD-10-CM

## 2025-04-16 DIAGNOSIS — E78.00 HYPERCHOLESTEREMIA: ICD-10-CM

## 2025-04-16 DIAGNOSIS — Z12.31 BREAST CANCER SCREENING BY MAMMOGRAM: ICD-10-CM

## 2025-04-16 DIAGNOSIS — I10 ESSENTIAL HYPERTENSION, BENIGN: Primary | ICD-10-CM

## 2025-04-16 PROCEDURE — G8399 PT W/DXA RESULTS DOCUMENT: HCPCS | Performed by: INTERNAL MEDICINE

## 2025-04-16 PROCEDURE — 3078F DIAST BP <80 MM HG: CPT | Performed by: INTERNAL MEDICINE

## 2025-04-16 PROCEDURE — 1123F ACP DISCUSS/DSCN MKR DOCD: CPT | Performed by: INTERNAL MEDICINE

## 2025-04-16 PROCEDURE — 99214 OFFICE O/P EST MOD 30 MIN: CPT | Performed by: INTERNAL MEDICINE

## 2025-04-16 PROCEDURE — 3017F COLORECTAL CA SCREEN DOC REV: CPT | Performed by: INTERNAL MEDICINE

## 2025-04-16 PROCEDURE — G8419 CALC BMI OUT NRM PARAM NOF/U: HCPCS | Performed by: INTERNAL MEDICINE

## 2025-04-16 PROCEDURE — G8427 DOCREV CUR MEDS BY ELIG CLIN: HCPCS | Performed by: INTERNAL MEDICINE

## 2025-04-16 PROCEDURE — 1090F PRES/ABSN URINE INCON ASSESS: CPT | Performed by: INTERNAL MEDICINE

## 2025-04-16 PROCEDURE — 1160F RVW MEDS BY RX/DR IN RCRD: CPT | Performed by: INTERNAL MEDICINE

## 2025-04-16 PROCEDURE — 1159F MED LIST DOCD IN RCRD: CPT | Performed by: INTERNAL MEDICINE

## 2025-04-16 PROCEDURE — 3074F SYST BP LT 130 MM HG: CPT | Performed by: INTERNAL MEDICINE

## 2025-04-16 PROCEDURE — 1036F TOBACCO NON-USER: CPT | Performed by: INTERNAL MEDICINE

## 2025-04-16 SDOH — ECONOMIC STABILITY: FOOD INSECURITY: WITHIN THE PAST 12 MONTHS, YOU WORRIED THAT YOUR FOOD WOULD RUN OUT BEFORE YOU GOT MONEY TO BUY MORE.: NEVER TRUE

## 2025-04-16 SDOH — ECONOMIC STABILITY: FOOD INSECURITY: WITHIN THE PAST 12 MONTHS, THE FOOD YOU BOUGHT JUST DIDN'T LAST AND YOU DIDN'T HAVE MONEY TO GET MORE.: NEVER TRUE

## 2025-04-16 ASSESSMENT — PATIENT HEALTH QUESTIONNAIRE - PHQ9
SUM OF ALL RESPONSES TO PHQ QUESTIONS 1-9: 0
2. FEELING DOWN, DEPRESSED OR HOPELESS: NOT AT ALL
SUM OF ALL RESPONSES TO PHQ QUESTIONS 1-9: 0
1. LITTLE INTEREST OR PLEASURE IN DOING THINGS: NOT AT ALL

## 2025-04-16 ASSESSMENT — ENCOUNTER SYMPTOMS
DIARRHEA: 0
NAUSEA: 0
SHORTNESS OF BREATH: 0
VOMITING: 0
COUGH: 0
ABDOMINAL PAIN: 0

## 2025-04-16 NOTE — PROGRESS NOTES
[facility]  [address], [place] [address]  Phone: [phone]      Cara Padilla (: 1950) is a 74 y.o. female, established patient, here for evaluation of the following chief complaint(s):  Follow-up     SUBJECTIVE/OBJECTIVE:  History of Present Illness  The patient is a 74-year-old female with a past medical history of hypertension, hypothyroidism, hypercholesterolemia, and osteoporosis. She is seen today for follow-up of blood pressure and review of her blood test done on 2025.Patient is with her , Daphnie.    Blood pressure is not monitored at home. Dietary modifications have been made, including reducing the intake of oily foods and using cholesterol-free oil, specifically olive oil and avocado oil. No specific health concerns are reported at this time.    A mammogram was performed in 2024 at [facility], with no reported breast pain. A bone density test was last conducted in . No neck swelling, pain, or instances of nose or mouth bleeding are reported.    Compliance with medication regimen is noted, including aspirin, levothyroxine 75 mcg, Maxzide, Boniva (administered once monthly), and a nasal spray as needed. No medication refills are required at this time. Blood test results indicate good control of blood sugar (92), kidney function, sodium, potassium, cholesterol (total 148, LDL 67), liver function, thyroid function, and hemoglobin.    FAMILY HISTORY  She reports a family history of thyroid issues and hypertension. No family history of diabetes.        No results found for: \"LABA1C\"   Hemoglobin   Date Value Ref Range Status   2025 13.0 11.1 - 15.9 g/dL Final     Hematocrit   Date Value Ref Range Status   2025 40.5 34.0 - 46.6 % Final     Creatinine   Date Value Ref Range Status   2025 0.87 0.57 - 1.00 mg/dL Final     Glucose   Date Value Ref Range Status   2025 92 70 - 99 mg/dL Final     TSH,

## 2025-04-16 NOTE — PROGRESS NOTES
Chief Complaint   Patient presents with    Follow-up     /79 (BP Site: Right Upper Arm, Patient Position: Sitting, BP Cuff Size: Medium Adult)   Pulse 71   Temp 97.4 °F (36.3 °C) (Oral)   Resp 16   Ht 1.575 m (5' 2\")   Wt 62.1 kg (137 lb)   SpO2 98%   BMI 25.06 kg/m²       \"Have you been to the ER, urgent care clinic since your last visit?  Hospitalized since your last visit?\"    NO    “Have you seen or consulted any other health care providers outside our system since your last visit?”    NO

## 2025-04-29 NOTE — ANESTHESIA POSTPROCEDURE EVALUATION
Department of Anesthesiology  Postprocedure Note    Patient: Cara Padilla  MRN: [id number]  YOB: 1950  Date of evaluation: 7/5/2024    Procedure Summary       Date: 07/05/24 Room / Location: [facility] 03 / [facility] ENDOSCOPY    Anesthesia Start: 0910 Anesthesia Stop: 0922    Procedures:       ESOPHAGOGASTRODUODENOSCOPY (Upper GI Region)      ESOPHAGOGASTRODUODENOSCOPY BIOPSY (Upper GI Region) Diagnosis:       Gastroesophageal reflux disease, unspecified whether esophagitis present      Heartburn      NSAID long-term use      (Gastroesophageal reflux disease, unspecified whether esophagitis present [K21.9])      (Heartburn [R12])      (NSAID long-term use [Z79.1])    Surgeons: Jose R Munoz MD Responsible Provider: Lb Sargent DO    Anesthesia Type: MAC ASA Status: 2            Anesthesia Type: No value filed.    Almita Phase I: Almita Score: 10    Almita Phase II: Almita Score: 10    Anesthesia Post Evaluation    Patient location during evaluation: PACU  Patient participation: complete - patient participated  Level of consciousness: awake and alert  Pain score: 0  Airway patency: patent  Nausea & Vomiting: no vomiting and no nausea  Cardiovascular status: hemodynamically stable  Respiratory status: acceptable  Hydration status: stable  Comments: Patient seen and examined.  Ready for discharge from PACU.  Pain management: adequate    No notable events documented.  
None

## 2025-05-19 ENCOUNTER — RESULTS FOLLOW-UP (OUTPATIENT)
Facility: CLINIC | Age: 75
End: 2025-05-19

## 2025-05-19 ENCOUNTER — HOSPITAL ENCOUNTER (OUTPATIENT)
Facility: HOSPITAL | Age: 75
Discharge: HOME | End: 2025-05-22
Payer: MEDICARE

## 2025-05-19 VITALS — BODY MASS INDEX: 25.21 KG/M2 | WEIGHT: 137 LBS | HEIGHT: 62 IN

## 2025-05-19 DIAGNOSIS — Z78.0 MENOPAUSE: ICD-10-CM

## 2025-05-19 DIAGNOSIS — Z12.31 BREAST CANCER SCREENING BY MAMMOGRAM: ICD-10-CM

## 2025-05-19 PROCEDURE — 77063 BREAST TOMOSYNTHESIS BI: CPT

## 2025-05-19 PROCEDURE — 77080 DXA BONE DENSITY AXIAL: CPT

## 2025-05-20 NOTE — TELEPHONE ENCOUNTER
Spoke with patient  about mammogram results     Mr. Padilla verbally understood and will notify as well

## 2025-05-20 NOTE — TELEPHONE ENCOUNTER
----- Message from Dr. Kristi Britt MD sent at 5/19/2025 11:46 PM EDT -----  Let her know Mammogram is negative,to repeat in  1 year

## 2025-05-29 ENCOUNTER — TELEPHONE (OUTPATIENT)
Facility: CLINIC | Age: 75
End: 2025-05-29

## 2025-05-29 NOTE — TELEPHONE ENCOUNTER
Patient's spouse is calling about his wife's dexa scan results--he can be reached at [phone] and states he wants to be contacted today.

## 2025-05-30 DIAGNOSIS — E78.00 HYPERCHOLESTEREMIA: ICD-10-CM

## 2025-05-30 RX ORDER — ATORVASTATIN CALCIUM 20 MG/1
20 TABLET, FILM COATED ORAL DAILY
Qty: 90 TABLET | Refills: 0 | Status: SHIPPED | OUTPATIENT
Start: 2025-05-30

## 2025-06-02 NOTE — TELEPHONE ENCOUNTER
Spoke with patient and relay message about Dexa      Kristi Britt MD Boswell, Rebecca, MA; [facility] Clinical Pool  Caller: Unspecified (4 days ago,  4:29 PM)   Message about Dexa scan is in her chart

## 2025-07-15 ENCOUNTER — OFFICE VISIT (OUTPATIENT)
Age: 75
End: 2025-07-15
Payer: MEDICARE

## 2025-07-15 VITALS
SYSTOLIC BLOOD PRESSURE: 138 MMHG | OXYGEN SATURATION: 98 % | BODY MASS INDEX: 24.92 KG/M2 | WEIGHT: 135.4 LBS | HEIGHT: 62 IN | DIASTOLIC BLOOD PRESSURE: 81 MMHG | TEMPERATURE: 98 F | HEART RATE: 74 BPM

## 2025-07-15 DIAGNOSIS — M81.0 SENILE OSTEOPOROSIS: Primary | ICD-10-CM

## 2025-07-15 DIAGNOSIS — E55.9 VITAMIN D DEFICIENCY: ICD-10-CM

## 2025-07-15 PROCEDURE — 1090F PRES/ABSN URINE INCON ASSESS: CPT | Performed by: INTERNAL MEDICINE

## 2025-07-15 PROCEDURE — 1126F AMNT PAIN NOTED NONE PRSNT: CPT | Performed by: INTERNAL MEDICINE

## 2025-07-15 PROCEDURE — 3079F DIAST BP 80-89 MM HG: CPT | Performed by: INTERNAL MEDICINE

## 2025-07-15 PROCEDURE — 3075F SYST BP GE 130 - 139MM HG: CPT | Performed by: INTERNAL MEDICINE

## 2025-07-15 PROCEDURE — G8427 DOCREV CUR MEDS BY ELIG CLIN: HCPCS | Performed by: INTERNAL MEDICINE

## 2025-07-15 PROCEDURE — 1123F ACP DISCUSS/DSCN MKR DOCD: CPT | Performed by: INTERNAL MEDICINE

## 2025-07-15 PROCEDURE — G8420 CALC BMI NORM PARAMETERS: HCPCS | Performed by: INTERNAL MEDICINE

## 2025-07-15 PROCEDURE — 99204 OFFICE O/P NEW MOD 45 MIN: CPT | Performed by: INTERNAL MEDICINE

## 2025-07-15 PROCEDURE — 1036F TOBACCO NON-USER: CPT | Performed by: INTERNAL MEDICINE

## 2025-07-15 PROCEDURE — G8399 PT W/DXA RESULTS DOCUMENT: HCPCS | Performed by: INTERNAL MEDICINE

## 2025-07-15 RX ORDER — LOSARTAN POTASSIUM 50 MG/1
TABLET ORAL
Qty: 10 TABLET | Refills: 0 | Status: SHIPPED | OUTPATIENT
Start: 2025-07-15

## 2025-07-15 NOTE — PROGRESS NOTES
[facility]              Deepa Gomez MD FACE        PCP: Kristi Britt MD     Referring Physician: Kristi Britt     Cara Padilla is a 75 y.o. female patient.    HPI     Initial visit  for  Osteoporosis  management   Pt with h/o HPL, HTN  , hypothyroidism , menopausal    Accompanied by  , who is a retired pharmacist      She cannot speak English   I am speaking to her in [place]     Since she had  diagnosis of Osteoporosis ,  she was given Ibandronate for 2 years .   She has no fracture history       Current Outpatient Medications   Medication Sig Dispense Refill    atorvastatin (LIPITOR) 20 MG tablet TAKE 1 TABLET EVERY DAY 90 tablet 0    triamterene-hydroCHLOROthiazide (MAXZIDE-25) 37.5-25 MG per tablet TAKE 1 TABLET EVERY DAY 90 tablet 1    levothyroxine (SYNTHROID) 75 MCG tablet TAKE 1 TABLET EVERY DAY 90 tablet 1    aspirin 81 MG EC tablet Take 1 tablet by mouth daily 90 tablet 0    azelastine (ASTELIN) 0.1 % nasal spray PUT 1 SPRAY TWICE DAILY IN BOTH NOSTRILS (Patient taking differently: as needed) 30 mL 5    acetaminophen (TYLENOL) 325 MG tablet Take 2 tablets by mouth every 4 hours as needed       No current facility-administered medications for this visit.     No Known Allergies    Review of Systems   Constitutional:  Positive for activity change, fatigue and unexpected weight change.      Blood pressure 138/81, pulse 74, temperature 98 °F (36.7 °C), temperature source Temporal, height 1.575 m (5' 2\"), weight 61.4 kg (135 lb 6.4 oz), SpO2 98%.  Physical Exam  Constitutional:       Appearance: Normal appearance. She is normal weight.   Cardiovascular:      Rate and Rhythm: Normal rate.   Pulmonary:      Effort: Pulmonary effort is normal.   Musculoskeletal:      Cervical back: Normal range of motion.   Neurological:      General: No focal deficit present.   Psychiatric:         Mood and Affect: Mood normal.         Labs    Diabetes    No results found for:

## 2025-07-15 NOTE — PROGRESS NOTES
Cara Padilla is a 75 y.o. female here for   Chief Complaint   Patient presents with    New Patient    Osteoporosis       1. Have you been to the ER, urgent care clinic since your last visit?  Hospitalized since your last visit? -N/A    2. Have you seen or consulted any other health care providers outside of the [facility] since your last visit?  Include any pap smears or colon screening.-N/A

## 2025-07-15 NOTE — PATIENT INSTRUCTIONS
RECLAST  infusion   at  [facility]   I/v  first  time     Stay on calcium and vit D       ---------------------------------------------------------------------------------------    Stop triamterene / hctz   for  a week  (  losartan  50 mg  a  day  in its place )         24 hour urine collection instructions  for calcium , creat  and  sodium      On the day you plan to collect urine - follow these steps     1. Discard first urine sample soon after you get up. Do not collect this  sample    2. Start collecting urine samples in the container  after the first void  the entire day and night on the first day .....    3. Refrigerate the collected urine or keep it at a cool place     4. Collect the  first sample next day  into the jar. This is your final sample to complete the 24 hr collection     5. Take this sample to the lab  or  physician office

## 2025-08-11 DIAGNOSIS — E78.00 HYPERCHOLESTEREMIA: ICD-10-CM

## 2025-08-11 DIAGNOSIS — I10 ESSENTIAL HYPERTENSION, BENIGN: ICD-10-CM

## 2025-08-11 DIAGNOSIS — E03.9 HYPOTHYROIDISM, UNSPECIFIED TYPE: ICD-10-CM

## 2025-08-11 RX ORDER — ATORVASTATIN CALCIUM 20 MG/1
20 TABLET, FILM COATED ORAL DAILY
Qty: 90 TABLET | Refills: 2 | Status: SHIPPED | OUTPATIENT
Start: 2025-08-11

## 2025-08-11 RX ORDER — LEVOTHYROXINE SODIUM 75 UG/1
75 TABLET ORAL DAILY
Qty: 90 TABLET | Refills: 2 | Status: SHIPPED | OUTPATIENT
Start: 2025-08-11

## 2025-08-11 RX ORDER — TRIAMTERENE AND HYDROCHLOROTHIAZIDE 37.5; 25 MG/1; MG/1
1 TABLET ORAL DAILY
Qty: 90 TABLET | Refills: 2 | Status: SHIPPED | OUTPATIENT
Start: 2025-08-11

## 2025-08-12 LAB
ALBUMIN SERPL-MCNC: 4.1 G/DL (ref 3.8–4.8)
ALP SERPL-CCNC: 80 IU/L (ref 44–121)
ALT SERPL-CCNC: 15 IU/L (ref 0–32)
AST SERPL-CCNC: 15 IU/L (ref 0–40)
BILIRUB SERPL-MCNC: 0.7 MG/DL (ref 0–1.2)
BUN SERPL-MCNC: 9 MG/DL (ref 8–27)
BUN/CREAT SERPL: 11 (ref 12–28)
CALCIUM SERPL-MCNC: 9.1 MG/DL (ref 8.7–10.3)
CHLORIDE SERPL-SCNC: 98 MMOL/L (ref 96–106)
CHOLEST SERPL-MCNC: 158 MG/DL (ref 100–199)
CO2 SERPL-SCNC: 22 MMOL/L (ref 20–29)
CREAT SERPL-MCNC: 0.85 MG/DL (ref 0.57–1)
EGFRCR SERPLBLD CKD-EPI 2021: 71 ML/MIN/1.73
GLOBULIN SER CALC-MCNC: 2.5 G/DL (ref 1.5–4.5)
GLUCOSE SERPL-MCNC: 93 MG/DL (ref 70–99)
HBA1C MFR BLD: 5.7 % (ref 4.8–5.6)
HDLC SERPL-MCNC: 75 MG/DL
LDLC SERPL CALC-MCNC: 68 MG/DL (ref 0–99)
POTASSIUM SERPL-SCNC: 4.1 MMOL/L (ref 3.5–5.2)
PROT SERPL-MCNC: 6.6 G/DL (ref 6–8.5)
SODIUM SERPL-SCNC: 132 MMOL/L (ref 134–144)
TRIGL SERPL-MCNC: 81 MG/DL (ref 0–149)
TSH SERPL DL<=0.005 MIU/L-ACNC: 1.97 UIU/ML (ref 0.45–4.5)
VLDLC SERPL CALC-MCNC: 15 MG/DL (ref 5–40)

## 2025-08-15 ENCOUNTER — OFFICE VISIT (OUTPATIENT)
Facility: CLINIC | Age: 75
End: 2025-08-15

## 2025-08-15 VITALS
HEART RATE: 82 BPM | TEMPERATURE: 97.5 F | SYSTOLIC BLOOD PRESSURE: 128 MMHG | DIASTOLIC BLOOD PRESSURE: 74 MMHG | BODY MASS INDEX: 25.03 KG/M2 | HEIGHT: 62 IN | RESPIRATION RATE: 16 BRPM | WEIGHT: 136 LBS | OXYGEN SATURATION: 99 %

## 2025-08-15 DIAGNOSIS — E03.9 HYPOTHYROIDISM, UNSPECIFIED TYPE: ICD-10-CM

## 2025-08-15 DIAGNOSIS — I10 ESSENTIAL HYPERTENSION, BENIGN: ICD-10-CM

## 2025-08-15 DIAGNOSIS — Z00.00 MEDICARE ANNUAL WELLNESS VISIT, SUBSEQUENT: Primary | ICD-10-CM

## 2025-08-15 DIAGNOSIS — M81.0 OSTEOPOROSIS OF LUMBAR SPINE: ICD-10-CM

## 2025-08-15 DIAGNOSIS — R73.09 ELEVATED HEMOGLOBIN A1C: ICD-10-CM

## 2025-08-15 DIAGNOSIS — M54.31 SCIATICA OF RIGHT SIDE: ICD-10-CM

## 2025-08-15 DIAGNOSIS — E78.00 HYPERCHOLESTEREMIA: ICD-10-CM

## 2025-08-15 RX ORDER — GABAPENTIN 100 MG/1
100 CAPSULE ORAL NIGHTLY
Qty: 90 CAPSULE | Refills: 0 | Status: SHIPPED | OUTPATIENT
Start: 2025-08-15 | End: 2025-11-13

## 2025-08-15 ASSESSMENT — PATIENT HEALTH QUESTIONNAIRE - PHQ9
SUM OF ALL RESPONSES TO PHQ QUESTIONS 1-9: 0
2. FEELING DOWN, DEPRESSED OR HOPELESS: NOT AT ALL
1. LITTLE INTEREST OR PLEASURE IN DOING THINGS: NOT AT ALL
SUM OF ALL RESPONSES TO PHQ QUESTIONS 1-9: 0

## 2025-08-16 ASSESSMENT — ENCOUNTER SYMPTOMS
COUGH: 0
VOMITING: 0
SHORTNESS OF BREATH: 0
ABDOMINAL PAIN: 0
NAUSEA: 0
DIARRHEA: 0

## 2025-08-26 ENCOUNTER — HOSPITAL ENCOUNTER (OUTPATIENT)
Facility: HOSPITAL | Age: 75
Discharge: HOME | End: 2025-08-29
Payer: MEDICARE

## 2025-08-26 DIAGNOSIS — M54.31 SCIATICA OF RIGHT SIDE: ICD-10-CM

## 2025-08-26 PROCEDURE — 72100 X-RAY EXAM L-S SPINE 2/3 VWS: CPT

## (undated) DEVICE — TOOL 9BA90 LEGEND 9CM 9MM BA: Brand: MIDAS REX

## (undated) DEVICE — CODMAN® SURGICAL PATTIES 1/2" X 3" (1.27CM X 7.62CM): Brand: CODMAN®

## (undated) DEVICE — TOOL 9MH30 LEGEND 9CM 3MM MH: Brand: MIDAS REX

## (undated) DEVICE — TOOL 8TA11 LEGEND 8CM 1.1MM TA: Brand: MIDAS REX ™

## (undated) DEVICE — 1200 GUARD II KIT W/5MM TUBE W/O VAC TUBE: Brand: GUARDIAN

## (undated) DEVICE — SYRINGE MEDICAL 3ML CLEAR PLASTIC STANDARD NON CONTROL LUERLOCK TIP DISPOSABLE

## (undated) DEVICE — BITEBLOCK ENDOSCP 60FR MAXI WHT POLYETH STURDY W/ VELC WVN

## (undated) DEVICE — TOOL 9AC90 LEGEND 9CM 9MM AC: Brand: MIDAS REX

## (undated) DEVICE — FLOSEAL MATRIX IS INDICATED IN SURGICAL PROCEDURES (OTHER THAN IN OPHTHALMIC) AS AN ADJUNCT TO HEMOSTASIS WHEN CONTROL OF BLEEDING BY LIGATURE OR CONVENTIONALPROCEDURES IS INEFFECTIVE OR IMPRACTICAL.: Brand: FLOSEAL HEMOSTATIC MATRIX

## (undated) DEVICE — UNIQCOT 1/4" X 1/4": Brand: UNIQCOT

## (undated) DEVICE — STERILE POLYISOPRENE POWDER-FREE SURGICAL GLOVES WITH EMOLLIENT COATING: Brand: PROTEXIS

## (undated) DEVICE — SUTURE NRLN SZ 4-0 L18IN NONABSORBABLE BLK L13MM TF 1/2 CIR C584D

## (undated) DEVICE — SOLIDIFIER FLD 2OZ 1500CC N DISINF IN BTL DISP SAFESORB

## (undated) DEVICE — TOOL F2/8TA23 LEGEND 8CM 2.3MM TAPER: Brand: MIDAS REX™

## (undated) DEVICE — SOLUTION IV 250ML 0.9% SOD CHL CLR INJ FLX BG CONT PRT CLSR

## (undated) DEVICE — DRAPE FLD WRM W44XL66IN C6L FOR INTRATEMP SYS THERMABASIN

## (undated) DEVICE — MARKER SKIN XR REFLCT LF SPHR DISP

## (undated) DEVICE — DURAGEN® PLUS DURAL REGENERATION MATRIX, 2 IN X 2 IN (5 CM X 5 CM)
Type: IMPLANTABLE DEVICE | Site: BRAIN | Status: NON-FUNCTIONAL
Brand: DURAGEN® PLUS

## (undated) DEVICE — IV STRT KT 3282] LSL INDUSTRIES INC]

## (undated) DEVICE — BLUNTFILL WITH FILTER: Brand: MONOJECT

## (undated) DEVICE — CANNULA CUSH AD W/ 14FT TBG

## (undated) DEVICE — SUTURE VCRL SZ 2-0 L18IN ABSRB UD L26MM CP-2 1/2 CIR REV J762D

## (undated) DEVICE — SYRINGE MED 5ML STD CLR PLAS LUERLOCK TIP N CTRL DISP

## (undated) DEVICE — KENDALL SCD EXPRESS SLEEVES, KNEE LENGTH, MEDIUM: Brand: KENDALL SCD

## (undated) DEVICE — SUTURE VCRL RAPIDE SZ 3-0 L18IN ABSRB UD PS-2 L19MM 3/8 CIR VR497

## (undated) DEVICE — KIT COLON W/ 1.1OZ LUB AND 2 END

## (undated) DEVICE — SET ADMIN 16ML TBNG L100IN 2 Y INJ SITE IV PIGGY BK DISP (ORDER IN MULIPLES OF 48)

## (undated) DEVICE — HANDLE LT SNAP ON ULT DURABLE LENS FOR TRUMPF ALC DISPOSABLE

## (undated) DEVICE — TOOL 8TD126 LEGEND 8CM 1.2MM 6MM DEPTH: Brand: MIDAS REX ™

## (undated) DEVICE — BLUNTFILL: Brand: MONOJECT

## (undated) DEVICE — SUTURE VCRL SZ 0 L18IN ABSRB VLT L36MM CT-1 1/2 CIR J740D

## (undated) DEVICE — PLEDGET SUT SFT OVL 3 8X5 16IN

## (undated) DEVICE — SOLUTION IV 1000ML 0.9% SOD CHL

## (undated) DEVICE — RUBBERBAND FASTENING W0.25XL3.5IN 5 PER PK

## (undated) DEVICE — PROBE 8225101 5PK STD PRASS FL TIP ROHS

## (undated) DEVICE — STRAP,POSITIONING,KNEE/BODY,FOAM,4X60": Brand: MEDLINE

## (undated) DEVICE — INFECTION CONTROL KIT SYS

## (undated) DEVICE — CATHETER IV 22GA L1IN OD0.8382-0.9144MM ID0.6096-0.6858MM 382523

## (undated) DEVICE — PREP SKN PREVAIL 40ML APPL --

## (undated) DEVICE — SURGICAL PROCEDURE KIT CRANIOTOMY

## (undated) DEVICE — REM POLYHESIVE ADULT PATIENT RETURN ELECTRODE: Brand: VALLEYLAB

## (undated) DEVICE — ELECTRODE,RADIOTRANSLUCENT,FOAM,3PK: Brand: MEDLINE

## (undated) DEVICE — PLEDGET SURG W3/16XL0.25IN THK1.65MM PTFE OVL FELT FOR THE